# Patient Record
Sex: MALE | Race: WHITE | NOT HISPANIC OR LATINO | Employment: FULL TIME | URBAN - METROPOLITAN AREA
[De-identification: names, ages, dates, MRNs, and addresses within clinical notes are randomized per-mention and may not be internally consistent; named-entity substitution may affect disease eponyms.]

---

## 2017-12-19 ENCOUNTER — COMMUNICATION - HEALTHEAST (OUTPATIENT)
Dept: INTERNAL MEDICINE | Facility: CLINIC | Age: 37
End: 2017-12-19

## 2017-12-19 ENCOUNTER — COMMUNICATION - HEALTHEAST (OUTPATIENT)
Dept: SCHEDULING | Facility: CLINIC | Age: 37
End: 2017-12-19

## 2018-01-19 ENCOUNTER — OFFICE VISIT - HEALTHEAST (OUTPATIENT)
Dept: INTERNAL MEDICINE | Facility: CLINIC | Age: 38
End: 2018-01-19

## 2018-01-19 DIAGNOSIS — J45.20 MILD INTERMITTENT ASTHMA WITHOUT COMPLICATION: ICD-10-CM

## 2018-01-19 DIAGNOSIS — Z79.899 MEDICATION MANAGEMENT: ICD-10-CM

## 2018-04-23 ENCOUNTER — OFFICE VISIT - HEALTHEAST (OUTPATIENT)
Dept: INTERNAL MEDICINE | Facility: CLINIC | Age: 38
End: 2018-04-23

## 2018-04-23 DIAGNOSIS — R53.83 FATIGUE: ICD-10-CM

## 2018-04-23 DIAGNOSIS — E78.5 HYPERLIPIDEMIA: ICD-10-CM

## 2018-04-23 LAB
ALBUMIN SERPL-MCNC: 3.8 G/DL (ref 3.5–5)
ALP SERPL-CCNC: 91 U/L (ref 45–120)
ALT SERPL W P-5'-P-CCNC: 34 U/L (ref 0–45)
ANION GAP SERPL CALCULATED.3IONS-SCNC: 12 MMOL/L (ref 5–18)
AST SERPL W P-5'-P-CCNC: 25 U/L (ref 0–40)
BILIRUB DIRECT SERPL-MCNC: 0.2 MG/DL
BILIRUB SERPL-MCNC: 0.7 MG/DL (ref 0–1)
BUN SERPL-MCNC: 8 MG/DL (ref 8–22)
CALCIUM SERPL-MCNC: 9.3 MG/DL (ref 8.5–10.5)
CHLORIDE BLD-SCNC: 104 MMOL/L (ref 98–107)
CHOLEST SERPL-MCNC: 187 MG/DL
CO2 SERPL-SCNC: 23 MMOL/L (ref 22–31)
CREAT SERPL-MCNC: 0.7 MG/DL (ref 0.7–1.3)
ERYTHROCYTE [DISTWIDTH] IN BLOOD BY AUTOMATED COUNT: 11.6 % (ref 11–14.5)
FASTING STATUS PATIENT QL REPORTED: YES
GFR SERPL CREATININE-BSD FRML MDRD: >60 ML/MIN/1.73M2
GLUCOSE BLD-MCNC: 79 MG/DL (ref 70–125)
HCT VFR BLD AUTO: 46.2 % (ref 40–54)
HDLC SERPL-MCNC: 45 MG/DL
HGB BLD-MCNC: 15.7 G/DL (ref 14–18)
LDLC SERPL CALC-MCNC: 114 MG/DL
MCH RBC QN AUTO: 28.9 PG (ref 27–34)
MCHC RBC AUTO-ENTMCNC: 33.9 G/DL (ref 32–36)
MCV RBC AUTO: 85 FL (ref 80–100)
PLATELET # BLD AUTO: 292 THOU/UL (ref 140–440)
PMV BLD AUTO: 8.2 FL (ref 7–10)
POTASSIUM BLD-SCNC: 4.4 MMOL/L (ref 3.5–5)
PROT SERPL-MCNC: 7 G/DL (ref 6–8)
RBC # BLD AUTO: 5.43 MILL/UL (ref 4.4–6.2)
SODIUM SERPL-SCNC: 139 MMOL/L (ref 136–145)
TRIGL SERPL-MCNC: 140 MG/DL
TSH SERPL DL<=0.005 MIU/L-ACNC: 0.59 UIU/ML (ref 0.3–5)
WBC: 9 THOU/UL (ref 4–11)

## 2018-04-24 ENCOUNTER — COMMUNICATION - HEALTHEAST (OUTPATIENT)
Dept: INTERNAL MEDICINE | Facility: CLINIC | Age: 38
End: 2018-04-24

## 2018-06-15 ENCOUNTER — COMMUNICATION - HEALTHEAST (OUTPATIENT)
Dept: SCHEDULING | Facility: CLINIC | Age: 38
End: 2018-06-15

## 2019-07-11 ENCOUNTER — COMMUNICATION - HEALTHEAST (OUTPATIENT)
Dept: SCHEDULING | Facility: CLINIC | Age: 39
End: 2019-07-11

## 2019-07-11 DIAGNOSIS — J45.20 MILD INTERMITTENT ASTHMA WITHOUT COMPLICATION: ICD-10-CM

## 2019-08-05 ENCOUNTER — COMMUNICATION - HEALTHEAST (OUTPATIENT)
Dept: INTERNAL MEDICINE | Facility: CLINIC | Age: 39
End: 2019-08-05

## 2019-08-06 ENCOUNTER — COMMUNICATION - HEALTHEAST (OUTPATIENT)
Dept: INTERNAL MEDICINE | Facility: CLINIC | Age: 39
End: 2019-08-06

## 2019-08-06 DIAGNOSIS — J45.20 MILD INTERMITTENT ASTHMA WITHOUT COMPLICATION: ICD-10-CM

## 2019-10-29 ENCOUNTER — COMMUNICATION - HEALTHEAST (OUTPATIENT)
Dept: INTERNAL MEDICINE | Facility: CLINIC | Age: 39
End: 2019-10-29

## 2019-10-29 DIAGNOSIS — J45.20 MILD INTERMITTENT ASTHMA WITHOUT COMPLICATION: ICD-10-CM

## 2019-11-14 ENCOUNTER — OFFICE VISIT (OUTPATIENT)
Dept: FAMILY MEDICINE | Facility: CLINIC | Age: 39
End: 2019-11-14
Payer: COMMERCIAL

## 2019-11-14 VITALS
HEART RATE: 60 BPM | TEMPERATURE: 97.4 F | WEIGHT: 210.4 LBS | DIASTOLIC BLOOD PRESSURE: 72 MMHG | OXYGEN SATURATION: 99 % | SYSTOLIC BLOOD PRESSURE: 126 MMHG | HEIGHT: 69 IN | BODY MASS INDEX: 31.16 KG/M2

## 2019-11-14 DIAGNOSIS — Z23 NEED FOR PROPHYLACTIC VACCINATION AND INOCULATION AGAINST INFLUENZA: ICD-10-CM

## 2019-11-14 DIAGNOSIS — F43.22 ADJUSTMENT DISORDER WITH ANXIOUS MOOD: Primary | ICD-10-CM

## 2019-11-14 DIAGNOSIS — F19.11 SUBSTANCE ABUSE IN REMISSION (H): ICD-10-CM

## 2019-11-14 DIAGNOSIS — F17.210 CIGARETTE SMOKER MOTIVATED TO QUIT: ICD-10-CM

## 2019-11-14 DIAGNOSIS — J45.20 MILD INTERMITTENT ASTHMA WITHOUT COMPLICATION: ICD-10-CM

## 2019-11-14 PROCEDURE — 90471 IMMUNIZATION ADMIN: CPT | Performed by: NURSE PRACTITIONER

## 2019-11-14 PROCEDURE — 99204 OFFICE O/P NEW MOD 45 MIN: CPT | Mod: 25 | Performed by: NURSE PRACTITIONER

## 2019-11-14 PROCEDURE — 90686 IIV4 VACC NO PRSV 0.5 ML IM: CPT | Performed by: NURSE PRACTITIONER

## 2019-11-14 SDOH — HEALTH STABILITY: MENTAL HEALTH: HOW OFTEN DO YOU HAVE A DRINK CONTAINING ALCOHOL?: NOT ASKED

## 2019-11-14 ASSESSMENT — MIFFLIN-ST. JEOR: SCORE: 1864.75

## 2019-11-14 NOTE — PATIENT INSTRUCTIONS
Patient Education     Anxiety Reaction  Anxiety is the feeling we all get when we think something bad might happen. It is a normal response to stress and usually causes only a mild reaction. When anxiety becomes more severe, it can interfere with daily life. In some cases, you may not even be aware of what it is you re anxious about. There may also be a genetic link or it may be a learned behavior in the home.  Both psychological and physical triggers cause stress reaction. It's often a response to fear or emotional stress, real or imagined. This stress may come from home, family, work, or social relationships.  During an anxiety reaction, you may feel:    Helpless    Nervous    Depressed    Irritable  Your body may show signs of anxiety in many ways. You may experience:    Dry mouth    Shakiness    Dizziness    Weakness    Trouble breathing    Breathing fast (hyperventilating)    Chest pressure    Sweating    Headache    Nausea    Diarrhea    Tiredness    Inability to sleep    Sexual problems  Home care    Try to locate the sources of stress in your life. They may not be obvious. These may include:  ? Daily hassles of life (such as traffic jams, missed appointments, or car troubles)  ? Major life changes, both good (new baby or job promotion) and bad (loss of job or loss of loved one)  ? Overload: feeling that you have too many responsibilities and can't take care of all of them at once  ? Feeling helpless or feeling that your problems are beyond what you re able to solve    Notice how your body reacts to stress. Learn to listen to your body signals. This will help you take action before the stress becomes severe.    When you can, do something about the source of your stress. (Avoid hassles, limit the amount of change that happens in your life at one time and take a break when you feel overloaded).    Unfortunately, many stressful situations can't be avoided. It is necessary to learn how to better manage stress.  There are many proven methods that will reduce your anxiety. These include simple things like exercise, good nutrition, and adequate rest. Also, there are certain techniques that are helpful:  ? Relaxation  ? Breathing exercises  ? Visualization  ? Biofeedback  ? Meditation  For more information about this, consult your healthcare provider or go to a local bookstore and review the many books and tapes available on this subject.  Follow-up care  If you feel that your anxiety is not responding to self-help measures, contact your healthcare provider or make an appointment with a counselor. You may need short-term psychological counseling and temporary medicine to help you manage stress.  Call 911  Call 911 if any of these happen:    Trouble breathing    Confusion    Drowsiness or trouble wakening    Fainting or loss of consciousness    Rapid heart rate    Seizure    New chest pain that becomes more severe, lasts longer, or spreads into your shoulder, arm, neck, jaw, or back  When to seek medical advice  Call your healthcare provider right away if any of these happen:    Your symptoms get worse    Severe headache not relieved by rest and mild pain reliever  Date Last Reviewed: 10/1/2017    2794-9962 "[employer]". [address] [place] [address]. All rights reserved. This information is not intended as a substitute for professional medical care. Always follow your healthcare professional's instructions.           Patient Education     Resources to Help You Quit Smoking  If you have quit smoking or are thinking about quitting, congratulations! It can be hard to quit smoking, but the benefits are well worth it. To help you quit and stay smoke-free, there are many resources that can help.  Your health plan  If you have health insurance, call them for more details about their phone coaching programs.    [facility]: 9-902-346-BLUE    [facility]: 7-724-923-QUIT    [employer] [facility]  [place]: 7-061-056-BLUE    [facility]: [phone]    [employer]: 1-498.781.9623    [facility] [employer] members: 1-891.540.7518    [facility] Plan: [phone]    [facility] Plan: 1-431.395.9730    [facility] [place]: 4-190-617-8600  American Cancer Society: 1-826.507.2806  The American Cancer Society can help you find local resources to quit smoking.  [employer]: 3-795-995-PLAN (8308)  Offers a telephone helpline, gum, patches and lozenges. These services are free for the uninsured and those without coverage. The online program is free to everyone at www.[employer].  American Lung Association: 2-485-XLTQ-USA (643-3712)  Provides a lung helpline as well as an online program, self-help book and group clinic support for quitting smoking. www.lung.org/stop-smoking  [employer] [place]: 9-573-439-QUIT (4191)  Offers a telephone hotline, online text chat and a website with tools, information and support for smokers who want to quit. www.smokefree.gov  Medication Therapy Management:  826.669.7692 ([facility])  925.583.5094 ([place])  This is a clinic program to help you quit smoking. It offers one-on-one sessions with a pharmacist.  [employer] Group Tobacco Cessation: 234.443.1697  Small group sessions held throughout the [facility] area for people who are trying to live free from tobacco. www.[employer].org/exhale  Call to reserve your spot or for additional information.  For informational purposes only. Not to replace the advice of your health care provider.  Copyright   2013 [employer]. All rights reserved. [employer] [id number] - Rev 12/15.  For informational purposes only. Not to replace the advice of your health care provider.  Copyright   2018 [employer]. All rights reserved.

## 2019-11-14 NOTE — PROGRESS NOTES
"Subjective     Mason Velarde is a 38 year old male who presents to clinic today for the following health issues:    HPI   New Patient/Transfer of Care  Abnormal Mood Symptoms  Onset: 4 days ago     Description:   Depression: no   Anxiety: YES    Accompanying Signs & Symptoms:  Still participating in activities that you used to enjoy: YES  Fatigue: no   Irritability: no   Difficulty concentrating: YES  Changes in appetite: no   Problems with sleep: no   Heart racing/beating fast : YES  Thoughts of hurting yourself or others: none    History:   Recent stress: YES- multiple factors   Prior depression hospitalization: None  Family history of depression: YES  Family history of anxiety: YES    Precipitating factors:   Alcohol/drug use: no - past     Alleviating factors:  Nothing     Therapies Tried and outcome: None    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  Asthma Follow-Up    Was ACT completed today?    Yes    ACT Total Scores 11/14/2019   ACT TOTAL SCORE (Goal Greater than or Equal to 20) 19   In the past 12 months, how many times did you visit the emergency room for your asthma without being admitted to the hospital? 0   In the past 12 months, how many times were you hospitalized overnight because of your asthma? 0       How many days per week do you miss taking your asthma controller medication?  I do not have an asthma controller medication    Please describe any recent triggers for your asthma: Patient is unaware of triggers " "and pt is smoker, ready to quit already has supplies    Have you had any Emergency Room Visits, Urgent Care Visits, or Hospital Admissions since your last office visit?  No      Patient Active Problem List   Diagnosis     Mild intermittent asthma without complication     Adjustment disorder with anxious mood     Substance abuse in remission (H)     Cigarette smoker motivated to quit     Past Surgical History:   Procedure Laterality Date     BACK SURGERY  2005    L1 vertebre plate (piece of rib put in spine)       Social History     Tobacco Use     Smoking status: Current Every Day Smoker     Packs/day: 0.50     Years: 25.00     Pack years: 12.50     Types: Cigarettes     Smokeless tobacco: Never Used   Substance Use Topics     Alcohol use: Not Currently     Family History   Problem Relation Age of Onset     Depression Mother      Mental Illness Mother      Heart Disease Father      Coronary Artery Disease Early Onset Father      Heart Failure Father      Hyperlipidemia Father      Depression Sister      Mental Illness Sister      Heart Disease Paternal Uncle      Coronary Artery Disease Early Onset Paternal Uncle          Current Outpatient Medications   Medication Sig Dispense Refill     albuterol (PROAIR RESPICLICK) 108 (90 Base) MCG/ACT inhaler Inhale 2 puffs into the lungs every 6 hours as needed for shortness of breath / dyspnea or wheezing 1 Inhaler 1     No Known Allergies  No lab results found.   BP Readings from Last 3 Encounters:   11/14/19 126/72    Wt Readings from Last 3 Encounters:   11/14/19 95.4 kg (210 lb 6.4 oz)                    Reviewed and updated as needed this visit by Provider         Review of Systems   ROS COMP: Constitutional, HEENT, cardiovascular, pulmonary, GI, , musculoskeletal, neuro, skin, endocrine and psych systems are negative, except as otherwise noted.      Objective    /72   Pulse 60   Temp 97.4  F (36.3  C) (Oral)   Ht 1.753 m (5' 9\")   Wt 95.4 kg (210 lb 6.4 oz)  "  SpO2 99%   BMI 31.07 kg/m    Body mass index is 31.07 kg/m .  Physical Exam   GENERAL: healthy, alert and no distress  EYES: Eyes grossly normal to inspection, PERRL and conjunctivae and sclerae normal  NECK: no adenopathy, no asymmetry, masses, or scars and thyroid normal to palpation  RESP: lungs clear to auscultation - no rales, rhonchi or wheezes  CV: regular rate and rhythm, normal S1 S2, no S3 or S4, no murmur, click or rub, no peripheral edema and peripheral pulses strong  ABDOMEN: soft, nontender, no hepatosplenomegaly, no masses and bowel sounds normal  MS: no gross musculoskeletal defects noted, no edema  SKIN: no suspicious lesions or rashes  NEURO: Normal strength and tone, mentation intact and speech normal  PSYCH: MENTAL STATUS EXAM  Appearance: appropriate  Attitude: cooperative  Behavior: normal, slightly restless   Eye Contact: normal  Speech: normal  Orientation: oriented to person , place, time and situation  Mood: states his mood has been stable/ good and anxious at times  Affect: Normal/bright, anxious appearing at times  Thought Process: clear     Diagnostic Test Results:  Labs reviewed in Epic        Assessment & Plan       ICD-10-CM    1. Adjustment disorder with anxious mood F43.22    2. Mild intermittent asthma without complication J45.20 albuterol (PROAIR RESPICLICK) 108 (90 Base) MCG/ACT inhaler   3. Substance abuse in remission (H) F19.11     meth   4. Cigarette smoker motivated to quit F17.200    5. Need for prophylactic vaccination and inoculation against influenza Z23 INFLUENZA VACCINE IM > 6 MONTHS VALENT IIV4 [14396]     Vaccine Administration, Initial [83196]   6. BMI 31.0-31.9,adult Z68.31    pt was sick with viral illness and stress he felt led to anxiety symptoms, we discussed anxiety in detail and pt reports worry that he has anxiety that causes it more than anything   He seems a bit anxious or just a lot of coffee perhaps, recommended he cut down from 4-5 cups to 1-2 daily,  "instructed to monitor and pt declines counseling or meds at this time    Discussed the pathophysiology of anxiety episodes and the various symptoms seen associated with anxiety episodes.  Discussed possible triggers including fatigue, depression, stress, and chemicals such as alcohol, caffeine and certain drugs.  Discussed the treatment including an aerobic exercise program, adequate rest, and both rescue meds and maintenance meds.     Doing well with sobriety, continue  In process of quitting smoking discussed can also be stressful, congrats on good lifestyle changes  Return to Clinic for health maintenance exam with     Asthma and using rescue only, consider spirometry or adding daily controller if using a lot of the albuterol, flu shot done      Tobacco Cessation:   reports that he has been smoking cigarettes. He has a 12.50 pack-year smoking history. He has never used smokeless tobacco.  Tobacco Cessation Action Plan: Self help information given to patient      BMI:   Estimated body mass index is 31.07 kg/m  as calculated from the following:    Height as of this encounter: 1.753 m (5' 9\").    Weight as of this encounter: 95.4 kg (210 lb 6.4 oz).   Weight management plan: deferred to e  Will do fasting lab work       See Patient Instructions    Return in about 4 weeks (around 12/12/2019) for next annual exam or as needed.    MAKAYLA Magaña [facility]    "

## 2019-11-15 ASSESSMENT — ASTHMA QUESTIONNAIRES: ACT_TOTALSCORE: 19

## 2020-01-02 ENCOUNTER — TELEPHONE (OUTPATIENT)
Dept: FAMILY MEDICINE | Facility: CLINIC | Age: 40
End: 2020-01-02

## 2020-01-02 DIAGNOSIS — F41.9 ANXIETY: ICD-10-CM

## 2020-01-02 DIAGNOSIS — F19.11 SUBSTANCE ABUSE IN REMISSION (H): ICD-10-CM

## 2020-01-02 DIAGNOSIS — E55.9 VITAMIN D DEFICIENCY: ICD-10-CM

## 2020-01-02 DIAGNOSIS — F43.22 ADJUSTMENT DISORDER WITH ANXIOUS MOOD: ICD-10-CM

## 2020-01-02 DIAGNOSIS — Z00.00 HEALTHCARE MAINTENANCE: Primary | ICD-10-CM

## 2020-01-03 NOTE — TELEPHONE ENCOUNTER
Reason for call:  Order   Order or referral being requested: lab  Reason for request: labs  Date needed: as soon as possible  Has the patient been seen by the PCP for this problem? YES    Additional comments: Patient has lab appointment scheduled 01/10/2020. Can you please put fasting lab orders in. Thanks!    Phone number to reach patient:  Home number on file [phone] (home)    Best Time:  anytime    Can we leave a detailed message on this number?  Not Applicable

## 2020-01-03 NOTE — TELEPHONE ENCOUNTER
It looks like Martita advised a follow-up visit in 4 weeks from 11/14 for an annual exam. Was patient intended to have this be an annual and not just lab only?  Defer to Martita in the office early next week.  SOL Quezada

## 2020-01-08 NOTE — TELEPHONE ENCOUNTER
I will sign these and pt needs to schedule a physical in which we can go over results (within a few days or week afterwards) (1/13-20th)    Routing to reception to schedule this  Thanks  Martita BENAVIDES CNP

## 2020-01-10 DIAGNOSIS — E55.9 VITAMIN D DEFICIENCY: ICD-10-CM

## 2020-01-10 DIAGNOSIS — Z00.00 HEALTHCARE MAINTENANCE: ICD-10-CM

## 2020-01-10 LAB
ALBUMIN SERPL-MCNC: 3.9 G/DL (ref 3.4–5)
ALP SERPL-CCNC: 69 U/L (ref 40–150)
ALT SERPL W P-5'-P-CCNC: 45 U/L (ref 0–70)
ANION GAP SERPL CALCULATED.3IONS-SCNC: 7 MMOL/L (ref 3–14)
AST SERPL W P-5'-P-CCNC: 29 U/L (ref 0–45)
BILIRUB SERPL-MCNC: 0.6 MG/DL (ref 0.2–1.3)
BUN SERPL-MCNC: 15 MG/DL (ref 7–30)
CALCIUM SERPL-MCNC: 9 MG/DL (ref 8.5–10.1)
CHLORIDE SERPL-SCNC: 105 MMOL/L (ref 94–109)
CHOLEST SERPL-MCNC: 181 MG/DL
CO2 SERPL-SCNC: 26 MMOL/L (ref 20–32)
CREAT SERPL-MCNC: 0.84 MG/DL (ref 0.66–1.25)
ERYTHROCYTE [DISTWIDTH] IN BLOOD BY AUTOMATED COUNT: 13.3 % (ref 10–15)
GFR SERPL CREATININE-BSD FRML MDRD: >90 ML/MIN/{1.73_M2}
GLUCOSE SERPL-MCNC: 87 MG/DL (ref 70–99)
HCT VFR BLD AUTO: 44.5 % (ref 40–53)
HDLC SERPL-MCNC: 57 MG/DL
HGB BLD-MCNC: 15.3 G/DL (ref 13.3–17.7)
LDLC SERPL CALC-MCNC: 106 MG/DL
MCH RBC QN AUTO: 29.5 PG (ref 26.5–33)
MCHC RBC AUTO-ENTMCNC: 34.4 G/DL (ref 31.5–36.5)
MCV RBC AUTO: 86 FL (ref 78–100)
NONHDLC SERPL-MCNC: 124 MG/DL
PLATELET # BLD AUTO: 225 10E9/L (ref 150–450)
POTASSIUM SERPL-SCNC: 4.1 MMOL/L (ref 3.4–5.3)
PROT SERPL-MCNC: 7.2 G/DL (ref 6.8–8.8)
RBC # BLD AUTO: 5.19 10E12/L (ref 4.4–5.9)
SODIUM SERPL-SCNC: 138 MMOL/L (ref 133–144)
TRIGL SERPL-MCNC: 88 MG/DL
TSH SERPL DL<=0.005 MIU/L-ACNC: 1.48 MU/L (ref 0.4–4)
WBC # BLD AUTO: 7.1 10E9/L (ref 4–11)

## 2020-01-10 PROCEDURE — 80053 COMPREHEN METABOLIC PANEL: CPT | Performed by: NURSE PRACTITIONER

## 2020-01-10 PROCEDURE — 36415 COLL VENOUS BLD VENIPUNCTURE: CPT | Performed by: NURSE PRACTITIONER

## 2020-01-10 PROCEDURE — 85027 COMPLETE CBC AUTOMATED: CPT | Performed by: NURSE PRACTITIONER

## 2020-01-10 PROCEDURE — 80061 LIPID PANEL: CPT | Performed by: NURSE PRACTITIONER

## 2020-01-10 PROCEDURE — 84443 ASSAY THYROID STIM HORMONE: CPT | Performed by: NURSE PRACTITIONER

## 2020-01-10 PROCEDURE — 82306 VITAMIN D 25 HYDROXY: CPT | Performed by: NURSE PRACTITIONER

## 2020-01-10 NOTE — LETTER
January 20, 2020      Mason Velarde  [address] [facility] [address]        Dear ,    We are writing to inform you of your test results.    Your test results fall within the expected range(s) or remain unchanged from previous results.  Please continue with current treatment plan.    Resulted Orders   Vitamin D Deficiency   Result Value Ref Range    Vitamin D Deficiency screening 27 20 - 75 ug/L      Comment:      Season, race, dietary intake, and treatment affect the concentration of   25-hydroxy-Vitamin D. Values may decrease during winter months and increase   during summer months. Values 20-29 ug/L may indicate Vitamin D insufficiency   and values <20 ug/L may indicate Vitamin D deficiency.  Vitamin D determination is routinely performed by an immunoassay specific for   25 hydroxyvitamin D3.  If an individual is on vitamin D2 (ergocalciferol)   supplementation, please specify 25 OH vitamin D2 and D3 level determination by   LCMSMS test VITD23.     **CBC with platelets FUTURE anytime   Result Value Ref Range    WBC 7.1 4.0 - 11.0 10e9/L    RBC Count 5.19 4.4 - 5.9 10e12/L    Hemoglobin 15.3 13.3 - 17.7 g/dL    Hematocrit 44.5 40.0 - 53.0 %    MCV 86 78 - 100 fl    MCH 29.5 26.5 - 33.0 pg    MCHC 34.4 31.5 - 36.5 g/dL    RDW 13.3 10.0 - 15.0 %    Platelet Count 225 150 - 450 10e9/L   **TSH with free T4 reflex FUTURE 1yr   Result Value Ref Range    TSH 1.48 0.40 - 4.00 mU/L   **Comprehensive metabolic panel FUTURE 1yr   Result Value Ref Range    Sodium 138 133 - 144 mmol/L    Potassium 4.1 3.4 - 5.3 mmol/L    Chloride 105 94 - 109 mmol/L    Carbon Dioxide 26 20 - 32 mmol/L    Anion Gap 7 3 - 14 mmol/L    Glucose 87 70 - 99 mg/dL      Comment:      Fasting specimen    Urea Nitrogen 15 7 - 30 mg/dL    Creatinine 0.84 0.66 - 1.25 mg/dL    GFR Estimate >90 >60 mL/min/[1.73_m2]      Comment:      Non  GFR Calc  Starting 12/18/2018, serum creatinine based estimated GFR (eGFR) will be   calculated  using the Chronic Kidney Disease Epidemiology Collaboration   (CKD-EPI) equation.      GFR Estimate If Black >90 >60 mL/min/[1.73_m2]      Comment:       GFR Calc  Starting 12/18/2018, serum creatinine based estimated GFR (eGFR) will be   calculated using the Chronic Kidney Disease Epidemiology Collaboration   (CKD-EPI) equation.      Calcium 9.0 8.5 - 10.1 mg/dL    Bilirubin Total 0.6 0.2 - 1.3 mg/dL    Albumin 3.9 3.4 - 5.0 g/dL    Protein Total 7.2 6.8 - 8.8 g/dL    Alkaline Phosphatase 69 40 - 150 U/L    ALT 45 0 - 70 U/L    AST 29 0 - 45 U/L   Lipid panel reflex to direct LDL Fasting   Result Value Ref Range    Cholesterol 181 <200 mg/dL    Triglycerides 88 <150 mg/dL      Comment:      Fasting specimen    HDL Cholesterol 57 >39 mg/dL    LDL Cholesterol Calculated 106 (H) <100 mg/dL      Comment:      Above desirable:  100-129 mg/dl  Borderline High:  130-159 mg/dL  High:             160-189 mg/dL  Very high:       >189 mg/dl      Non HDL Cholesterol 124 <130 mg/dL       If you have any questions or concerns, please call the clinic at the number listed above.       Sincerely,        [facility]

## 2020-01-13 LAB — DEPRECATED CALCIDIOL+CALCIFEROL SERPL-MC: 27 UG/L (ref 20–75)

## 2020-01-16 NOTE — PROGRESS NOTES
"  3  SUBJECTIVE:   CC: Mason Velarde is an 39 year old male who presents for preventive health visit.     Healthy Habits:    Do you get at least three servings of calcium containing foods daily (dairy, green leafy vegetables, etc.)? yes    Amount of exercise or daily activities, outside of work: 5 day(s) per week    Problems taking medications regularly No    Medication side effects: No    Have you had an eye exam in the past two years? yes    Do you see a dentist twice per year? 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 Yes        Social History     Tobacco Use     Smoking status: Current Every Day Smoker     Packs/day: 0.50     Years: 25.00     Pack years: 12.50     Types: Cigarettes     Smokeless tobacco: Never Used   Substance Use Topics     Alcohol use: Not Currently     If you drink alcohol do you typically have >3 drinks per day or >7 drinks per week? No                      Last PSA: No results found for: PSA    Reviewed orders with patient. Reviewed health maintenance and updated orders accordingly - Yes  Lab work is in process  Labs reviewed in EPIC  BP Readings from Last 3 Encounters:   01/17/20 130/84   11/14/19 126/72    Wt " Readings from Last 3 Encounters:   01/17/20 95.1 kg (209 lb 11.2 oz)   11/14/19 95.4 kg (210 lb 6.4 oz)                  Patient Active Problem List   Diagnosis     Mild intermittent asthma without complication     Adjustment disorder with anxious mood     Substance abuse in remission (H)     Cigarette smoker motivated to quit     BMI 31.0-31.9,adult     Past Surgical History:   Procedure Laterality Date     BACK SURGERY  2005    L1 vertebre plate (piece of rib put in spine)       Social History     Tobacco Use     Smoking status: Current Every Day Smoker     Packs/day: 0.50     Years: 25.00     Pack years: 12.50     Types: Cigarettes     Smokeless tobacco: Never Used   Substance Use Topics     Alcohol use: Not Currently     Family History   Problem Relation Age of Onset     Depression Mother      Mental Illness Mother      Heart Disease Father      Coronary Artery Disease Early Onset Father      Heart Failure Father      Hyperlipidemia Father      Depression Sister      Mental Illness Sister      Heart Disease Paternal Uncle      Coronary Artery Disease Early Onset Paternal Uncle          Current Outpatient Medications   Medication Sig Dispense Refill     albuterol (PROAIR RESPICLICK) 108 (90 Base) MCG/ACT inhaler Inhale 2 puffs into the lungs every 6 hours as needed for shortness of breath / dyspnea or wheezing 1 Inhaler 1     No Known Allergies  Recent Labs   Lab Test 01/10/20  0856   *   HDL 57   TRIG 88   ALT 45   CR 0.84   GFRESTIMATED >90   GFRESTBLACK >90   POTASSIUM 4.1   TSH 1.48        Reviewed and updated as needed this visit by clinical staff         Reviewed and updated as needed this visit by Provider        Past Medical History:   Diagnosis Date     Uncomplicated asthma       Past Surgical History:   Procedure Laterality Date     BACK SURGERY  2005    L1 vertebre plate (piece of rib put in spine)       ROS:  CONSTITUTIONAL: NEGATIVE for fever, chills, change in weight  INTEGUMENTARY/SKIN:  "NEGATIVE for worrisome rashes, moles or lesions  EYES: NEGATIVE for vision changes or irritation  ENT: NEGATIVE for ear, mouth and throat problems  RESP: NEGATIVE for significant cough or SOB  CV: NEGATIVE for chest pain, palpitations or peripheral edema  GI: NEGATIVE for nausea, abdominal pain, heartburn, or change in bowel habits   male: negative for dysuria, hematuria, decreased urinary stream, erectile dysfunction, urethral discharge  MUSCULOSKELETAL: NEGATIVE for significant arthralgias or myalgia  NEURO: NEGATIVE for weakness, dizziness or paresthesias  ENDOCRINE: NEGATIVE for temperature intolerance, skin/hair changes  HEME/ALLERGY/IMMUNE: NEGATIVE for bleeding problems  PSYCHIATRIC: NEGATIVE for changes in mood or affect    OBJECTIVE:   /84   Pulse 69   Temp 97.9  F (36.6  C) (Temporal)   Resp 12   Ht 1.767 m (5' 9.57\")   Wt 95.1 kg (209 lb 11.2 oz)   SpO2 98%   BMI 30.46 kg/m       EXAM:  GENERAL: healthy, alert and no distress  EYES: Eyes grossly normal to inspection, PERRL and conjunctivae and sclerae normal  HENT: ear canals and TM's normal, nose and mouth without ulcers or lesions  NECK: no adenopathy, no asymmetry, masses, or scars and thyroid normal to palpation  RESP: lungs clear to auscultation - no rales, rhonchi or wheezes  CV: regular rate and rhythm, normal S1 S2, no S3 or S4, no murmur, click or rub, no peripheral edema and peripheral pulses strong  ABDOMEN: soft, nontender, no hepatosplenomegaly, no masses and bowel sounds normal   (male): normal male genitalia without lesions or urethral discharge, no hernia  MS: no gross musculoskeletal defects noted, no edema  SKIN: no suspicious lesions or rashes  NEURO: Normal strength and tone, mentation intact and speech normal  PSYCH: mentation appears normal, affect normal/bright    Diagnostic Test Results:  Labs reviewed in Epic    ASSESSMENT/PLAN:       ICD-10-CM    1. Routine general medical examination at a health care facility " "Z00.00        COUNSELING:  Reviewed preventive health counseling, as reflected in patient instructions       Regular exercise       Healthy diet/nutrition       Vision screening       Immunizations    Vaccinated for: TDAP             Safe sex practices/STD prevention       Osteoporosis Prevention/Bone Health    Estimated body mass index is 31.07 kg/m  as calculated from the following:    Height as of 11/14/19: 1.753 m (5' 9\").    Weight as of 11/14/19: 95.4 kg (210 lb 6.4 oz).    Weight management plan: Discussed healthy diet and exercise guidelines     reports that he has been smoking cigarettes. He has a 12.50 pack-year smoking history. He has never used smokeless tobacco.  Tobacco Cessation Action Plan: Self help information given to patient    Counseling Resources:  ATP IV Guidelines  Pooled Cohorts Equation Calculator  FRAX Risk Assessment  ICSI Preventive Guidelines  Dietary Guidelines for Americans, 2010  USDA's MyPlate  ASA Prophylaxis  Lung CA Screening    MAKAYLA Magaña CNP  [facility] [place] [facility]  "

## 2020-01-17 ENCOUNTER — OFFICE VISIT (OUTPATIENT)
Dept: FAMILY MEDICINE | Facility: CLINIC | Age: 40
End: 2020-01-17
Payer: COMMERCIAL

## 2020-01-17 VITALS
WEIGHT: 209.7 LBS | DIASTOLIC BLOOD PRESSURE: 84 MMHG | HEART RATE: 69 BPM | RESPIRATION RATE: 12 BRPM | HEIGHT: 70 IN | OXYGEN SATURATION: 98 % | TEMPERATURE: 97.9 F | BODY MASS INDEX: 30.02 KG/M2 | SYSTOLIC BLOOD PRESSURE: 130 MMHG

## 2020-01-17 DIAGNOSIS — Z00.00 ROUTINE GENERAL MEDICAL EXAMINATION AT A HEALTH CARE FACILITY: Primary | ICD-10-CM

## 2020-01-17 PROCEDURE — 90471 IMMUNIZATION ADMIN: CPT | Performed by: NURSE PRACTITIONER

## 2020-01-17 PROCEDURE — 90715 TDAP VACCINE 7 YRS/> IM: CPT | Performed by: NURSE PRACTITIONER

## 2020-01-17 PROCEDURE — 99395 PREV VISIT EST AGE 18-39: CPT | Mod: 25 | Performed by: NURSE PRACTITIONER

## 2020-01-17 ASSESSMENT — MIFFLIN-ST. JEOR: SCORE: 1865.57

## 2020-03-03 ENCOUNTER — TELEPHONE (OUTPATIENT)
Dept: FAMILY MEDICINE | Facility: CLINIC | Age: 40
End: 2020-03-03

## 2020-03-03 NOTE — LETTER
[facility] [place] [facility]  [address] [place] [id number]  Phone: [phone]  Fax: [phone]              March 3, 2020      Mason Velarde  [address] [facility] [address]        Dear Mason,    I care about your health and have reviewed your health plan. I have reviewed your medical conditions, medication list, and lab results and am making recommendations based on this review, to better manage your health.    You are in particular need of attention regarding:  -Asthma    I am recommending that you:  -Complete and return the attached ASTHMA CONTROL TEST.  If your total score is 19 or less or you have been to the ER or urgent care for your asthma, then please schedule an asthma followup appointment.      Please call us at [phone] (or use [email or web address]) to address the above recommendations.     Thank you for trusting [facility] and we appreciate the opportunity to serve you.  We look forward to supporting your healthcare needs in the future.    Healthy Regards,            Martita Heard NP

## 2020-03-03 NOTE — TELEPHONE ENCOUNTER
Panel Management Review      Patient has the following on his problem list:     Asthma review     ACT Total Scores 11/14/2019   ACT TOTAL SCORE (Goal Greater than or Equal to 20) 19   In the past 12 months, how many times did you visit the emergency room for your asthma without being admitted to the hospital? 0   In the past 12 months, how many times were you hospitalized overnight because of your asthma? 0      1. Is Asthma diagnosis on the Problem List? Yes    2. Is Asthma listed on Health Maintenance? Yes    3. Patient is due for:  ACT      Composite cancer screening  Chart review shows that this patient is due/due soon for the following None  Summary:    Patient is due/failing the following:   ACT    Action needed:   Patient needs to do ACT.    Type of outreach:    Sent letter. and Copy of ACT mailed to patient, will reach out in 5 days.    Questions for provider review:    None                                                                                   Thanks,  Delores Andersen

## 2020-03-03 NOTE — LETTER
Mason Velarde  [address] [facility] [address]              [facility]  [address] [address]

## 2020-03-05 ENCOUNTER — COMMUNICATION - HEALTHEAST (OUTPATIENT)
Dept: INTERNAL MEDICINE | Facility: CLINIC | Age: 40
End: 2020-03-05

## 2020-03-05 DIAGNOSIS — J45.20 MILD INTERMITTENT ASTHMA WITHOUT COMPLICATION: ICD-10-CM

## 2020-06-05 ENCOUNTER — TELEPHONE (OUTPATIENT)
Dept: FAMILY MEDICINE | Facility: CLINIC | Age: 40
End: 2020-06-05

## 2020-06-05 ENCOUNTER — VIRTUAL VISIT (OUTPATIENT)
Dept: FAMILY MEDICINE | Facility: CLINIC | Age: 40
End: 2020-06-05
Payer: COMMERCIAL

## 2020-06-05 DIAGNOSIS — J45.20 MILD INTERMITTENT ASTHMA WITHOUT COMPLICATION: ICD-10-CM

## 2020-06-05 DIAGNOSIS — F19.11 SUBSTANCE ABUSE IN REMISSION (H): ICD-10-CM

## 2020-06-05 DIAGNOSIS — J45.41 MODERATE PERSISTENT ASTHMA WITH ACUTE EXACERBATION: Primary | ICD-10-CM

## 2020-06-05 PROCEDURE — 99214 OFFICE O/P EST MOD 30 MIN: CPT | Mod: 95 | Performed by: NURSE PRACTITIONER

## 2020-06-05 RX ORDER — FLUTICASONE PROPIONATE 220 UG/1
1 AEROSOL, METERED RESPIRATORY (INHALATION) 2 TIMES DAILY
Qty: 1 INHALER | Refills: 3 | Status: SHIPPED | OUTPATIENT
Start: 2020-06-05 | End: 2020-07-27

## 2020-06-05 RX ORDER — CETIRIZINE HYDROCHLORIDE 10 MG/1
10 TABLET ORAL DAILY
Qty: 90 TABLET | Refills: 1 | Status: SHIPPED | OUTPATIENT
Start: 2020-06-05

## 2020-06-05 RX ORDER — ALBUTEROL SULFATE 90 UG/1
2 POWDER, METERED RESPIRATORY (INHALATION) EVERY 6 HOURS PRN
Qty: 1 INHALER | Refills: 3 | Status: SHIPPED | OUTPATIENT
Start: 2020-06-05 | End: 2021-09-13

## 2020-06-05 ASSESSMENT — ASTHMA QUESTIONNAIRES
QUESTION_1 LAST FOUR WEEKS HOW MUCH OF THE TIME DID YOUR ASTHMA KEEP YOU FROM GETTING AS MUCH DONE AT WORK, SCHOOL OR AT HOME: SOME OF THE TIME
QUESTION_4 LAST FOUR WEEKS HOW OFTEN HAVE YOU USED YOUR RESCUE INHALER OR NEBULIZER MEDICATION (SUCH AS ALBUTEROL): TWO OR THREE TIMES PER WEEK
QUESTION_3 LAST FOUR WEEKS HOW OFTEN DID YOUR ASTHMA SYMPTOMS (WHEEZING, COUGHING, SHORTNESS OF BREATH, CHEST TIGHTNESS OR PAIN) WAKE YOU UP AT NIGHT OR EARLIER THAN USUAL IN THE MORNING: FOUR OR MORE NIGHTS A WEEK
ACT_TOTALSCORE: 12
QUESTION_2 LAST FOUR WEEKS HOW OFTEN HAVE YOU HAD SHORTNESS OF BREATH: ONCE A DAY
QUESTION_5 LAST FOUR WEEKS HOW WOULD YOU RATE YOUR ASTHMA CONTROL: SOMEWHAT CONTROLLED

## 2020-06-05 NOTE — PROGRESS NOTES
"Mason Velarde is a 39 year old male who is being evaluated via a billable telephone visit.      The patient has been notified of following:     \"This telephone visit will be conducted via a call between you and your physician/provider. We have found that certain health care needs can be provided without the need for a physical exam.  This service lets us provide the care you need with a short phone conversation.  If a prescription is necessary we can send it directly to your pharmacy.  If lab work is needed we can place an order for that and you can then stop by our lab to have the test done at a later time.    Telephone visits are billed at different rates depending on your insurance coverage. During this emergency period, for some insurers they may be billed the same as an in-person visit.  Please reach out to your insurance provider with any questions.    If during the course of the call the physician/provider feels a telephone visit is not appropriate, you will not be charged for this service.\"    Patient has given verbal consent for Telephone visit?  Yes    What phone number would you like to be contacted at? [phone]    How would you like to obtain your AVS? 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       Objective   Reported vitals:  There were no vitals " taken for this visit.   healthy, alert and no distress  PSYCH: Alert and oriented times 3; coherent speech, normal   rate and volume, able to articulate logical thoughts, able   to abstract reason, no tangential thoughts, no hallucinations   or delusions  His affect is normal  RESP: No cough, no audible wheezing, able to talk in full sentences  Remainder of exam unable to be completed due to telephone visits    Diagnostic Test Results:  Labs reviewed in Epic        Assessment/Plan:    ICD-10-CM    1. Moderate persistent asthma with acute exacerbation  J45.41 fluticasone (FLOVENT HFA) 220 MCG/ACT inhaler     cetirizine (ZYRTEC) 10 MG tablet   2. Mild intermittent asthma without complication  J45.20 albuterol (PROAIR RESPICLICK) 108 (90 Base) MCG/ACT inhaler   3. 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      Phone call duration:  20 minutes    MAKAYLA Magaña CNP

## 2020-06-05 NOTE — TELEPHONE ENCOUNTER
Prior Authorization Retail Medication Request    Medication/Dose: albuterol (PROAIR RESPICLICK) 108 (90 Base) MCG/ACT inhaler  ICD code (if different than what is on RX):    Previously Tried and Failed:    Rationale:      Insurance Name:    Insurance ID:  [id number]  Phone: [phone]    Pharmacy Information (if different than what is on RX)  [facility]  Phone:  [phone]  Fax: [phone]

## 2020-06-05 NOTE — PATIENT INSTRUCTIONS
Patient Education     Asthma Trigger Checklist  Allergens, irritants, and other things may trigger your asthma. Check the box next to each of your triggers. After each trigger is a list of ways to avoid it.     Dust mites. Dust mites live in mattresses, bedding, carpets, curtains, and indoor dust.    To kill dust mites, wash bedding in hot water (130 F) each week.    Cover mattress and pillows with special dust-mite-proof cases.    Don t use upholstered furniture like sofas or chairs in the bedroom.    Use allergy-proof filters for air conditioners and furnaces. Replace or clean them as instructed.    If you can, replace carpeting with wood or tile juan manuel, especially in the bedroom.    Animals. Animals with fur or feathers shed dander (allergens).    It s best to choose a pet that doesn t have fur or feathers, such as a fish or a reptile.    If you have pets, keep them off of your bed and out of your bedroom.    Wash your hands and clothes after handling pets.      Mold. Mold grows in damp places, such as bathrooms, basements, and closets.    Ask someone to clean damp areas in your home every week. Or try wearing a face mask while you clean.    Run an exhaust fan while bathing. Or leave a window open in the bathroom.    Repair water leaks in or around your home.    Have someone else cut grass or rake leaves, if possible.    Don t use vaporizers or humidifiers. They encourage mold growth.      Pollen. Pollen from trees, grasses, and weeds is a common allergen. (Flower pollens are generally not a problem).    Try to learn what types of pollen affect you most. Pollen levels vary depending on the plant, the season, and the time of day.    If possible, use air conditioning instead of opening the windows in your home or car.    Have someone else do yard work, if possible.      Cockroaches. Roaches are found in many homes and produce allergens.    Keep your kitchen clean and dry. A leaky faucet or drain can attract  roaches.    Remove garbage from your home daily.    Store food in tightly sealed containers. Wash dishes as soon as they are used.    Use bait stations or traps to control roaches. Avoid using chemical sprays.      Smoke. Smoke may be from cigarettes, cigars, pipes, incense or candles, barbecues or grills, and fireplaces.    Don t smoke. And don t let people smoke in your home or car.    When you travel, ask for nonsmoking rental cars and hotel rooms.    Avoid fireplaces and wood stoves. If you can t, sit away from them. Make sure the smoke is directed outside.    Don t burn incense or use candles.    Move away from smoky outdoor cooking grills.      Smog.  Smog is from car exhaust and other pollution.    Read or listen to local air-quality reports. These let you know when air quality is poor.    Stay indoors as much as you can on smoggy days. If possible, use air conditioning instead of opening the windows.    In your car, set air conditioning to recirculate air, so less pollution gets in.      Strong odors. These include air fresheners, deodorizers, and cleaning products; perfume, deodorant, and other beauty products; incense and candles; and insect sprays and other sprays.    Use scent-free products like deodorant or body lotion.    Avoid using cleaning products with bleach and ammonia. Make your own cleaning solution with white vinegar, baking soda, or mild dish soap.    Use exhaust fans while cooking. Or open a window, if possible.     Avoid perfumes, air fresheners, potpourri, and other scented products.      Other irritants. These include dust, aerosol sprays, and powders.    Wear a face mask while doing tasks like sanding, dusting, sweeping, and yard work. Open doors and windows if working indoors.    Use pump spray bottles instead of aerosols.    Pour liquid  onto a rag or cloth instead of spraying them.      Weather. Weather conditions can trigger symptoms or make them worse.    Watch for very high  or low temperatures, very humid conditions, or a lot of wind, as these conditions can make symptoms worse.    Limit outdoor activity during the type of weather that affects you.    Wear a scarf over your mouth and nose in cold weather.      Colds, flu, and sinus infections. Upper respiratory infections can trigger asthma.    Wash your hands often with soap and warm water or use a hand  containing alcohol.    Get a yearly flu shot. And ask if you should get a pneumonia vaccine.    Take care of your general health. Get plenty of sleep. And eat a variety of healthy foods.      Food additives. Food additives can trigger asthma flare-ups in some people.    Check food labels for sulfites or other similar ingredients. These are often found in foods such as wine, beer, and dried fruits.    Avoid foods that contain these additives.      Medicine. Aspirin, NSAIDS like ibuprofen and naproxen, and heart medicines like beta-blockers may be triggers.    Tell your health care provider if you think certain medicines trigger symptoms.     Be sure to read the labels on over-the-counter medicines. They may have ingredients that cause symptoms for you.     , Emotions. Laughing, crying, or feeling excited are triggers for some people.     To help you stay calm: Try breathing in slowly through your nose for a count of 2 seconds. Close your lips and breathe out for 4 seconds. Repeat.    Try to focus on a soothing image in your mind. This will help relax you and calm your breathing.    Remember to take your daily controller medicines. When you re upset or under stress, it s easy to forget.      Exercise. For some people, exercise can trigger symptoms. Don t let this keep you from being active.     If you have not been exercising regularly, start slow and work up gradually.    Take all of your medicines as prescribed.    If you use quick-relief medicine, make sure you have it with you when you exercise.    Stop if you have any  symptoms. Make sure you talk with your provider about these symptoms.  Date Last Reviewed: 1/1/2017 2000-2019 [employer]. [address] [place] [address], [place] [address]. All rights reserved. This information is not intended as a substitute for professional medical care. Always follow your healthcare professional's instructions.           Patient Education     Asthma Medicine     Get used to using your inhaled corticosteroid medicine before you brush your teeth. That way you will always rinse your mouth afterward.   Medicines play a key role in controlling asthma. Some help control asthma symptoms and prevent flare-ups. Others are used to treat symptoms when they occur. Always take your medicine as prescribed. Know the names of your medicines and how and when to use them. If you have any questions about your medicines, talk with your healthcare provider or pharmacist.  Quick-relief medicine  Quick-relief (also called  rescue ) medicines work by relaxing the muscles around the airways. This helps ease symptoms such as coughing, wheezing, and shortness of breath. Keep your quick-relief inhaler with you at all times. Quick-relief medicines:    Are inhaled when needed and only when needed. Use your quick-relief medicine when you first notice your asthma is getting worse.    Start to open the airways within a few minutes after you use them.    Can help stop a flare-up once it has begun.    May be used before exercise as directed by your healthcare provider.  Long-term control medicine  Long-term control (also called  maintenance  or controller) medicines help reduce swelling and inflammation of the airways. Some keep the muscles around your airways relaxed. This makes the airways less sensitive to triggers and less likely to flare up. Long-term control medicines:    Are taken on a schedule. For most people, this is every day. They are taken even when you feel fine.    Help keep asthma under control so you re  less likely to have symptoms.    Will not stop a flare-up once it has begun.     Inhaled corticosteroids  Inhaled corticosteroids are safe for long-term use. They are not the  steroids  that you hear about athletes abusing. They usually don't cause serious side effects. That s because they re inhaled directly into the lungs. The chance of minor side effects can be lowered even more if you:    Use a spacer with your inhaler. Ask your healthcare provider or pharmacist about using a spacer if you don't currently use one.    Rinse your mouth, gargle, and spit out the water after using your inhaled corticosteroid medicine.    Follow all instructions for cleaning inhalers and spacers.    Work with your healthcare provider to find the lowest dose that controls your asthma.      Tips for taking medicine  Remembering to take medicine each day can be hard for anyone. It can be even harder to remember when you don t have symptoms. Try these tips:    Develop a routine. For example, take long-term controllers as part of getting ready for bed, before you brush your teeth in the morning, or both.    Make sure you understand what long-term controllers do and don t do.    Refill your prescriptions on time, or even ahead of time, so you don t run out.    Carry your quick-relief medicine with you. If you can, keep a spare quick-relief inhaler at work, at school, or in your gym bag.    When you travel, make sure you have enough medicine to last for your entire trip.    When traveling by air, keep your medicines with you, not packed in your luggage.    Make sure you know how to tell if your inhaler is empty. Ask your provider or pharmacist, or check the instructions that come with your inhaler.  Working with your healthcare provider  By working with your healthcare provider, you can get the most benefit from your medicine. Talk with your healthcare provider about:    Getting the right dose. Over time, your healthcare provider may raise  or lower the dose of your controller medicine. The goal is to find the amount of medicine to keep asthma in control, without taking more than is needed.    Finding the right medicines for you. Each person is unique. It may take a few tries to find the right medicine or combination of medicines for you. If one medicine doesn t work well for you, another may work better.    Minimizing side effects. If you have side effects, don t just stop taking your medicine. Instead, call your healthcare provider. A new medicine or change in the amount of medicine may solve the problem.  Date Last Reviewed: 10/1/2016    3024-3144 [employer]. [address] [place] [address], [place] [address]. All rights reserved. This information is not intended as a substitute for professional medical care. Always follow your healthcare professional's instructions.

## 2020-06-05 NOTE — TELEPHONE ENCOUNTER
PA Initiation    Medication: albuterol (PROAIR RESPICLICK) 108 (90 Base) MCG/ACT inhaler  Insurance Company: [employer] - Phone [phone] Fax [phone]  Pharmacy Filling the Rx: [facility] #[id number] - [place], [place] - [facility] [address] AT [facility] [address]  Filling Pharmacy Phone: [phone]  Filling Pharmacy Fax: [phone]  Start Date: 6/5/2020

## 2020-06-05 NOTE — TELEPHONE ENCOUNTER
Prior Authorization Approval    Authorization Effective Date: 5/6/2020  Authorization Expiration Date: 7/5/2020  Medication: albuterol (PROAIR RESPICLICK) 108 (90 Base) MCG/ACT inhaler  Approved Dose/Quantity:   Reference #: [id number]   Insurance Company: [employer] - Phone [phone] Fax [phone]  Expected CoPay:       CoPay Card Available:      Foundation Assistance Needed:    Which Pharmacy is filling the prescription (Not needed for infusion/clinic administered): [facility] #[id number] - [address] AT [facility] [address]  Pharmacy Notified: Yes  Patient Notified: Yes, **Instructed pharmacy to notify patient when script is ready to /ship.**

## 2020-06-06 ASSESSMENT — ASTHMA QUESTIONNAIRES: ACT_TOTALSCORE: 12

## 2020-06-22 ENCOUNTER — COMMUNICATION - HEALTHEAST (OUTPATIENT)
Dept: INTERNAL MEDICINE | Facility: CLINIC | Age: 40
End: 2020-06-22

## 2020-06-22 DIAGNOSIS — J45.20 MILD INTERMITTENT ASTHMA WITHOUT COMPLICATION: ICD-10-CM

## 2020-07-03 ENCOUNTER — COMMUNICATION - HEALTHEAST (OUTPATIENT)
Dept: INTERNAL MEDICINE | Facility: CLINIC | Age: 40
End: 2020-07-03

## 2020-07-27 ENCOUNTER — VIRTUAL VISIT (OUTPATIENT)
Dept: FAMILY MEDICINE | Facility: CLINIC | Age: 40
End: 2020-07-27
Payer: COMMERCIAL

## 2020-07-27 DIAGNOSIS — F41.1 GENERALIZED ANXIETY DISORDER WITH PANIC ATTACKS: Primary | ICD-10-CM

## 2020-07-27 DIAGNOSIS — J45.40 MODERATE PERSISTENT ASTHMA WITHOUT COMPLICATION: ICD-10-CM

## 2020-07-27 DIAGNOSIS — F41.0 GENERALIZED ANXIETY DISORDER WITH PANIC ATTACKS: Primary | ICD-10-CM

## 2020-07-27 PROCEDURE — 99214 OFFICE O/P EST MOD 30 MIN: CPT | Mod: 95 | Performed by: NURSE PRACTITIONER

## 2020-07-27 RX ORDER — ALBUTEROL SULFATE 90 UG/1
2 AEROSOL, METERED RESPIRATORY (INHALATION) EVERY 4 HOURS PRN
COMMUNITY
Start: 2020-06-22 | End: 2020-07-27

## 2020-07-27 RX ORDER — HYDROXYZINE PAMOATE 25 MG/1
25 CAPSULE ORAL 4 TIMES DAILY PRN
Qty: 120 CAPSULE | Refills: 1 | Status: SHIPPED | OUTPATIENT
Start: 2020-07-27 | End: 2024-01-10

## 2020-07-27 RX ORDER — FLUTICASONE PROPIONATE 220 UG/1
1 AEROSOL, METERED RESPIRATORY (INHALATION) 2 TIMES DAILY
Qty: 1 INHALER | Refills: 3 | Status: SHIPPED | OUTPATIENT
Start: 2020-07-27 | End: 2021-01-12

## 2020-07-27 RX ORDER — ALBUTEROL SULFATE 90 UG/1
2 POWDER, METERED RESPIRATORY (INHALATION) EVERY 6 HOURS PRN
Qty: 1 INHALER | Refills: 3 | Status: SHIPPED | OUTPATIENT
Start: 2020-07-27 | End: 2023-02-14

## 2020-07-27 ASSESSMENT — ANXIETY QUESTIONNAIRES
2. NOT BEING ABLE TO STOP OR CONTROL WORRYING: SEVERAL DAYS
IF YOU CHECKED OFF ANY PROBLEMS ON THIS QUESTIONNAIRE, HOW DIFFICULT HAVE THESE PROBLEMS MADE IT FOR YOU TO DO YOUR WORK, TAKE CARE OF THINGS AT HOME, OR GET ALONG WITH OTHER PEOPLE: SOMEWHAT DIFFICULT
6. BECOMING EASILY ANNOYED OR IRRITABLE: NOT AT ALL
1. FEELING NERVOUS, ANXIOUS, OR ON EDGE: MORE THAN HALF THE DAYS
5. BEING SO RESTLESS THAT IT IS HARD TO SIT STILL: NOT AT ALL
3. WORRYING TOO MUCH ABOUT DIFFERENT THINGS: MORE THAN HALF THE DAYS
7. FEELING AFRAID AS IF SOMETHING AWFUL MIGHT HAPPEN: NOT AT ALL
GAD7 TOTAL SCORE: 7

## 2020-07-27 ASSESSMENT — PATIENT HEALTH QUESTIONNAIRE - PHQ9
5. POOR APPETITE OR OVEREATING: MORE THAN HALF THE DAYS
SUM OF ALL RESPONSES TO PHQ QUESTIONS 1-9: 0

## 2020-07-27 NOTE — PROGRESS NOTES
"Mason Velarde is a 39 year old male who is being evaluated via a billable video visit.      The patient has been notified of following:     \"This video visit will be conducted via a call between you and your physician/provider. We have found that certain health care needs can be provided without the need for an in-person physical exam.  This service lets us provide the care you need with a video conversation.  If a prescription is necessary we can send it directly to your pharmacy.  If lab work is needed we can place an order for that and you can then stop by our lab to have the test done at a later time.    Video visits are billed at different rates depending on your insurance coverage.  Please reach out to your insurance provider with any questions.    If during the course of the call the physician/provider feels a video visit is not appropriate, you will not be charged for this service.\"    Patient has given verbal consent for Video visit? Yes  How would you like to obtain your AVS? Mail a copy  If you are dropped from the video visit, the video invite should be resent to: Text to cell phone: [phone]  Will anyone else be joining your video visit? No    Subjective     Mason Velarde is a 39 year old male who presents today via video visit for the following health issues:    HPI    Anxiety Follow-Up    How are you doing with your anxiety since your last visit? Worsened. Patient would like to be prescribed Hydroxyzine.     Are you having other symptoms that might be associated with anxiety? Yes:  panic attacks, anxious, worrying more, chest gets heavy, feels like cannot breathe    Have you had a significant life event? No     Are you feeling depressed? No    Do you have any concerns with your use of alcohol or other drugs? No    Social History     Tobacco Use     Smoking status: Current Every Day Smoker     Packs/day: 0.50     Years: 25.00     Pack years: 12.50     Types: Cigarettes     Smokeless tobacco: Never Used "   Substance Use Topics     Alcohol use: Not Currently     Drug use: Not Currently     Types: Methamphetamines     Comment: in remission      No flowsheet data found.  No flowsheet data found.     Tightening in chest and anxiety   Started when on a trip with his mom  Rough  Hydroxyzine he had around, took the edge off and worked well, thinks 25mg  Needs something to calm him down in the moment   Sister takes Gabapentin, he doesn't want to take due to past history        No flowsheet data found.  No flowsheet data found.      How many servings of fruits and vegetables do you eat daily?  4 or more    On average, how many sweetened beverages do you drink each day (Examples: soda, juice, sweet tea, etc.  Do NOT count diet or artificially sweetened beverages)?   0    How many days per week do you exercise enough to make your heart beat faster? 4    How many minutes a day do you exercise enough to make your heart beat faster? 30 - 60    How many days per week do you miss taking your medication? 0    Asthma Follow-Up    Was ACT completed today?    Yes    ACT Total Scores 7/27/2020   ACT TOTAL SCORE (Goal Greater than or Equal to 20) 18   In the past 12 months, how many times did you visit the emergency room for your asthma without being admitted to the hospital? 0   In the past 12 months, how many times were you hospitalized overnight because of your asthma? 0       How many days per week do you miss taking your asthma controller medication?  0    Please describe any recent triggers for your asthma: exercise or sports and emotions    Have you had any Emergency Room Visits, Urgent Care Visits, or Hospital Admissions since your last office visit?  No       Video Start Time: 9:04 AM      Flovent helps a lot, remembering to take daily  Biking a lot and air still feels cold when he breathes but fine-no difficulties catching breath doesn't need rescue as often    Patient Active Problem List   Diagnosis     Mild intermittent  asthma without complication     Adjustment disorder with anxious mood     Substance abuse in remission (H)     Cigarette smoker motivated to quit     BMI 31.0-31.9,adult     Past Surgical History:   Procedure Laterality Date     BACK SURGERY  2005    L1 vertebre plate (piece of rib put in spine)       Social History     Tobacco Use     Smoking status: Current Every Day Smoker     Packs/day: 0.50     Years: 25.00     Pack years: 12.50     Types: Cigarettes     Smokeless tobacco: Never Used   Substance Use Topics     Alcohol use: Not Currently     Family History   Problem Relation Age of Onset     Depression Mother      Mental Illness Mother      Heart Disease Father      Coronary Artery Disease Early Onset Father      Heart Failure Father      Hyperlipidemia Father      Depression Sister      Mental Illness Sister      Heart Disease Paternal Uncle      Coronary Artery Disease Early Onset Paternal Uncle          No Known Allergies  BP Readings from Last 3 Encounters:   01/17/20 130/84   11/14/19 126/72    Wt Readings from Last 3 Encounters:   01/17/20 95.1 kg (209 lb 11.2 oz)   11/14/19 95.4 kg (210 lb 6.4 oz)                    Reviewed and updated as needed this visit by Provider         Review of Systems   Constitutional, HEENT, cardiovascular, pulmonary, GI, , musculoskeletal, neuro, skin, endocrine and psych systems are negative, except as otherwise noted.      Objective             Physical Exam     GENERAL: Healthy, alert and no distress  EYES: Eyes grossly normal to inspection.  No discharge or erythema, or obvious scleral/conjunctival abnormalities.  RESP: No audible wheeze, cough, or visible cyanosis.  No visible retractions or increased work of breathing.    SKIN: Visible skin clear. No significant rash, abnormal pigmentation or lesions.  NEURO: Cranial nerves grossly intact.  Mentation and speech appropriate for age.  PSYCH: Mentation appears normal, affect normal/bright, judgement and insight intact,  "normal speech and appearance well-groomed.      Diagnostic Test Results:  Labs reviewed in Epic        Assessment & Plan       ICD-10-CM    1. Generalized anxiety disorder with panic attacks  F41.1 hydrOXYzine (VISTARIL) 25 MG capsule    F41.0 MENTAL HEALTH REFERRAL  - Adult; Outpatient Treatment; Individual/Couples/Family/Group Therapy/Health Psychology; [facility]: [facility] [phone]; We will contact you to schedule the appointment or please call with any questions   2. Moderate persistent asthma without complication  J45.40 fluticasone (FLOVENT HFA) 220 MCG/ACT inhaler     albuterol (PROAIR RESPICLICK) 108 (90 Base) MCG/ACT inhaler   strongly encouraged counseling and pt does agree to work on this more, mom recognizes symptoms better than he does  Doing well, sobriety well, wants to stay away from gabapentin knows some who got hooked, hydroxyzine works well so will stick with this, doesn't want ssri at this time    Asthma sounds controlled, ACT a bit low, air feels cold when exercising, can do inhaler beforehand to see if helps  At some point should do in person visit to check lungs, flu shot in fall      BMI:   Estimated body mass index is 30.46 kg/m  as calculated from the following:    Height as of 1/17/20: 1.767 m (5' 9.57\").    Weight as of 1/17/20: 95.1 kg (209 lb 11.2 oz).   Weight management plan: Discussed healthy diet and exercise guidelines            No follow-ups on file.    MAKAYLA Magaña CNP  [facility] CARE      Video-Visit Details    Type of service:  Video Visit    Video End Time:9:24 AM    Originating Location (pt. Location): Home    Distant Location (provider location):  [facility] PRIMARY CARE     Platform used for Video Visit: Aisha    No follow-ups on file.       MAKAYLA Magaña CNP      "

## 2020-07-27 NOTE — PATIENT INSTRUCTIONS
Please start with counseling, therapy every week or two to try to work on the panic attacks and anxiety. Use the hydroxyzine as needed     I refilled your inhalers, call when you need a refill. Someone will call to do the questionnaires with you and we can keep track of your asthma and anxiety to see what might be causing your symptoms more--probably a combination of both.     Keep biking and staying active, work on healthy food choices and good sleep to lose a little weight that also helps you ståy healthy.     Take care!

## 2020-07-28 ASSESSMENT — ANXIETY QUESTIONNAIRES: GAD7 TOTAL SCORE: 7

## 2020-07-28 ASSESSMENT — ASTHMA QUESTIONNAIRES: ACT_TOTALSCORE: 18

## 2020-08-12 DIAGNOSIS — J45.40 MODERATE PERSISTENT ASTHMA WITHOUT COMPLICATION: ICD-10-CM

## 2020-08-17 NOTE — TELEPHONE ENCOUNTER
Routing refill request to provider for review/approval because:  ACT score does not meet RN refill protocol  Last ACT score 18 on 7/27/20    Kenna Barnett RN   [facility]

## 2020-08-18 RX ORDER — FLUTICASONE PROPIONATE 220 UG/1
1 AEROSOL, METERED RESPIRATORY (INHALATION) 2 TIMES DAILY
Qty: 1 INHALER | Refills: 3 | OUTPATIENT
Start: 2020-08-18

## 2020-11-07 ENCOUNTER — HEALTH MAINTENANCE LETTER (OUTPATIENT)
Age: 40
End: 2020-11-07

## 2021-01-08 DIAGNOSIS — J45.40 MODERATE PERSISTENT ASTHMA WITHOUT COMPLICATION: ICD-10-CM

## 2021-01-12 RX ORDER — FLUTICASONE PROPIONATE 220 UG/1
1 AEROSOL, METERED RESPIRATORY (INHALATION) 2 TIMES DAILY
Qty: 1 INHALER | Refills: 3 | Status: SHIPPED | OUTPATIENT
Start: 2021-01-12 | End: 2023-02-14

## 2021-01-12 NOTE — TELEPHONE ENCOUNTER
"Requested Prescriptions   Pending Prescriptions Disp Refills     fluticasone (FLOVENT HFA) 220 MCG/ACT inhaler 1 Inhaler 3     Sig: Inhale 1 puff into the lungs 2 times daily       Inhaled Steroids Protocol Failed - 1/8/2021  9:55 AM        Failed - Asthma control assessment score within normal limits in last 6 months     Please review ACT score.     ACT Total Scores 11/14/2019 6/5/2020 7/27/2020   ACT TOTAL SCORE (Goal Greater than or Equal to 20) 19 12 18   In the past 12 months, how many times did you visit the emergency room for your asthma without being admitted to the hospital? 0 0 0   In the past 12 months, how many times were you hospitalized overnight because of your asthma? 0 0 0               Passed - Patient is age 12 or older        Passed - Medication is active on med list        Passed - Recent (6 mo) or future (30 days) visit within the authorizing provider's specialty     Patient had office visit in the last 6 months or has a visit in the next 30 days with authorizing provider or within the authorizing provider's specialty.  See \"Patient Info\" tab in inbasket, or \"Choose Columns\" in Meds & Orders section of the refill encounter.               Routing refill request to provider for review/approval because:  ACT<20        "

## 2021-01-18 DIAGNOSIS — J45.20 MILD INTERMITTENT ASTHMA WITHOUT COMPLICATION: ICD-10-CM

## 2021-01-18 RX ORDER — ALBUTEROL SULFATE 90 UG/1
2 POWDER, METERED RESPIRATORY (INHALATION) EVERY 6 HOURS PRN
Status: CANCELLED | OUTPATIENT
Start: 2021-01-18

## 2021-01-18 NOTE — TELEPHONE ENCOUNTER
Due for health maintenance exam, please schedule this and I can refill to bridge if he is completely out in order to hold him over     I'll leave it up to him if he wants to come fasting for lab work or not, could wait a year if he wishes  Should do flu shot if hasn't had yet this year    Thanks  Martita

## 2021-01-18 NOTE — TELEPHONE ENCOUNTER
Reason for Call:  Medication or medication refill:albuterol (PROAIR RESPICLICK) 108 (90 Base) MCG/ACT inhaler    Do you use a [facility]?  Name of the pharmacy and phone number for the current request:     [facility]      Name of the medication requested:     Other request:     Can we leave a detailed message on this number? YES    Phone number patient can be reached at: Home number on file [phone] (home)    Best Time: ANY    Call taken on 1/18/2021 at 10:57 AM by Zenia Lopez

## 2021-01-24 DIAGNOSIS — J45.40 MODERATE PERSISTENT ASTHMA WITHOUT COMPLICATION: ICD-10-CM

## 2021-01-25 RX ORDER — FLUTICASONE PROPIONATE 220 UG/1
1 AEROSOL, METERED RESPIRATORY (INHALATION) 2 TIMES DAILY
OUTPATIENT
Start: 2021-01-25

## 2021-03-27 ENCOUNTER — HEALTH MAINTENANCE LETTER (OUTPATIENT)
Age: 41
End: 2021-03-27

## 2021-05-27 ENCOUNTER — RECORDS - HEALTHEAST (OUTPATIENT)
Dept: ADMINISTRATIVE | Facility: CLINIC | Age: 41
End: 2021-05-27

## 2021-05-30 NOTE — TELEPHONE ENCOUNTER
RN cannot approve Refill Request    RN can NOT refill this medication Protocol failed and NO refill given.        Priscilla Li, [facility] Triage/Med Refill 7/11/2019    Requested Prescriptions   Pending Prescriptions Disp Refills     VENTOLIN HFA 90 mcg/actuation inhaler [Pharmacy Med Name: VENTOLIN HFA INH W/DOS CTR 200PUFFS] 18 g 0     Sig: INHALE 2 PUFFS EVERY 6 HOURS AS NEEDE FOR WHEEZING       Albuterol/Levalbuterol Refill Protocol Failed - 7/11/2019  4:59 PM        Failed - PCP or prescribing provider visit in last year     Last office visit with prescriber/PCP: 4/23/2018 Pia Dickens MD OR same dept: Visit date not found OR same specialty: Visit date not found Last physical: Visit date not found       Next appt within 3 mo: Visit date not found  Next physical within 3 mo: Visit date not found  Prescriber OR PCP: Pia Dickens MD  Last diagnosis associated with med order: There are no diagnoses linked to this encounter.  If protocol passes may refill for 6 months if within 3 months of last provider visit (or a total of 9 months). If patient requesting >1 inhaler per month refill x 6 months and have patient make appointment with provider.

## 2021-05-30 NOTE — TELEPHONE ENCOUNTER
Refill Request  Did you contact pharmacy: Yes  Medication name:   Requested Prescriptions     Pending Prescriptions Disp Refills     VENTOLIN HFA 90 mcg/actuation inhaler [Pharmacy Med Name: VENTOLIN HFA INH W/DOS CTR 200PUFFS] 18 g 0     Sig: INHALE 2 PUFFS EVERY 6 HOURS AS NEEDE FOR WHEEZING     Who prescribed the medication:   Pia Dickens MD  Pharmacy Name and Location:   [id number]  Is patient out of medication: Yes  Patient notified refills processed in 72 hours:  no  Okay to leave a detailed message: yes    Patient is requesting the status of above medication.

## 2021-05-31 ENCOUNTER — RECORDS - HEALTHEAST (OUTPATIENT)
Dept: ADMINISTRATIVE | Facility: CLINIC | Age: 41
End: 2021-05-31

## 2021-05-31 VITALS — WEIGHT: 233 LBS

## 2021-05-31 NOTE — TELEPHONE ENCOUNTER
"Ok to set this up so patient has refills \"on file\" or do you like to see patient's if they need to use more often? Thank you.    Holly Sanon, [facility]    "

## 2021-05-31 NOTE — TELEPHONE ENCOUNTER
Medication Question or Clarification  Who is calling: Patient  What medication are you calling about? (include dose and sig)    Disp Refills Start End    VENTOLIN HFA 90 mcg/actuation inhaler 18 g 0 7/12/2019     Sig: INHALE 2 PUFFS EVERY 6 HOURS AS NEEDE FOR WHEEZING    Sent to pharmacy as: VENTOLIN HFA 90 mcg/actuation inhaler    E-Prescribing Status: Receipt confirmed by pharmacy (7/12/2019  7:19 AM CDT)      Who prescribed the medication?: Rupali Stoll MD  What is your question/concern?: Patient is requesting additional refills be sent to his pharmacy so that they may have them on file.  Patient stated he contacted his pharmacy and was told there were not refills available of this medication.  Pharmacy: [facility])  Okay to leave a detailed message?: No  Site CMT - Please call the pharmacy to obtain any additional needed information.

## 2021-06-01 VITALS — WEIGHT: 231 LBS

## 2021-06-02 NOTE — TELEPHONE ENCOUNTER
RN cannot approve Refill Request    RN can NOT refill this medication Protocol failed and NO refill given.     Priscilla Li, [facility] Triage/Med Refill 10/29/2019    Requested Prescriptions   Pending Prescriptions Disp Refills     VENTOLIN HFA 90 mcg/actuation inhaler 18 g 3     Sig: INHALE 2 PUFFS EVERY 6 HOURS AS NEEDE FOR WHEEZING       Albuterol/Levalbuterol Refill Protocol Failed - 10/29/2019  7:40 AM        Failed - PCP or prescribing provider visit in last year     Last office visit with prescriber/PCP: Visit date not found OR same dept: Visit date not found OR same specialty: 4/23/2018 Pia Dickens MD Last physical: Visit date not found       Next appt within 3 mo: Visit date not found  Next physical within 3 mo: Visit date not found  Prescriber OR PCP: Rupali Stoll MD  Last diagnosis associated with med order: 1. Mild intermittent asthma without complication  - VENTOLIN HFA 90 mcg/actuation inhaler; INHALE 2 PUFFS EVERY 6 HOURS AS NEEDE FOR WHEEZING  Dispense: 18 g; Refill: 3    If protocol passes may refill for 6 months if within 3 months of last provider visit (or a total of 9 months). If patient requesting >1 inhaler per month refill x 6 months and have patient make appointment with provider.

## 2021-06-02 NOTE — TELEPHONE ENCOUNTER
Refill Request  Did you contact pharmacy: Request received from patient's pharmacy via fax.   Medication name:   Requested Prescriptions     Pending Prescriptions Disp Refills     VENTOLIN HFA 90 mcg/actuation inhaler 18 g 3     Sig: INHALE 2 PUFFS EVERY 6 HOURS AS NEEDE FOR WHEEZING     Who prescribed the medication: Rupali Stoll MD   Pharmacy Name and Location: [facility])  Is patient out of medication: Information not provided.   Patient notified refills processed in 72 hours:  no  Okay to leave a detailed message: no

## 2021-06-06 NOTE — TELEPHONE ENCOUNTER
RN cannot approve Refill Request    RN can NOT refill this medication Protocol failed and NO refill given.      Priscilla Li, [facility] Triage/Med Refill 3/6/2020    Requested Prescriptions   Pending Prescriptions Disp Refills     VENTOLIN HFA 90 mcg/actuation inhaler [Pharmacy Med Name: VENTOLIN HFA INH W/DOS CTR 200PUFFS] 18 g 3     Sig: INHALE 2 PUFFS BY MOUTH EVERY 6 HOURS AS NEEDED FOR WHEEZING       Albuterol/Levalbuterol Refill Protocol Failed - 3/5/2020  3:56 PM        Failed - PCP or prescribing provider visit in last year     Last office visit with prescriber/PCP: Visit date not found OR same dept: Visit date not found OR same specialty: 4/23/2018 Pia Dickens MD Last physical: Visit date not found       Next appt within 3 mo: Visit date not found  Next physical within 3 mo: Visit date not found  Prescriber OR PCP: Rupali Stoll MD  Last diagnosis associated with med order: 1. Mild intermittent asthma without complication  - VENTOLIN HFA 90 mcg/actuation inhaler [Pharmacy Med Name: VENTOLIN HFA INH W/DOS CTR 200PUFFS]; INHALE 2 PUFFS BY MOUTH EVERY 6 HOURS AS NEEDED FOR WHEEZING  Dispense: 18 g; Refill: 3    If protocol passes may refill for 6 months if within 3 months of last provider visit (or a total of 9 months). If patient requesting >1 inhaler per month refill x 6 months and have patient make appointment with provider.

## 2021-06-09 NOTE — TELEPHONE ENCOUNTER
Called [facility] pharmacy and confirmed Ventolin does not need a PA, was processed through successfully last week.   No further action needed.

## 2021-06-09 NOTE — TELEPHONE ENCOUNTER
Prior Authorization Request  Who s requesting:  Pharmacy  [place]  Medication Name: VENTOLIN HFA 90 mcg/actuation inhaler   Insurance Plan:   Insurance Member ID Number:  [id number]  CoverMyMeds Key: N/A  Informed patient that prior authorizations can take up to 10 business days for response:   No  Okay to leave a detailed message: Yes

## 2021-06-09 NOTE — TELEPHONE ENCOUNTER
Central PA team  [phone]  Pool: [facility] PA MED ([id number])          PA has been initiated.       PA form completed and faxed insurance via Cover My Meds     Key:  [id number]      Medication:  VENTOLIN HFA    Insurance:  [employer]         Response will be received via fax and may take up to 5-10 business days depending on plan

## 2021-06-15 NOTE — PROGRESS NOTES
[facility] Follow Up Note    Mason Velarde   37 y.o. male    Date of Visit: 1/19/2018    Chief Complaint   Patient presents with     Follow-up     medication management especially his inhaler.     Subjective  Mason comes in today to reestablish care with me and to get a refill on an inhaler.  I have not seen him since 2009.  He had problems with alcoholism and methamphetamine use and has been sober for over 200 days now.  He is currently living at the [facility] and working there and will become moving on to a sober house and going to school.  He needs an inhaler as he does get intermittent wheezing and asthma problems.  He is otherwise feeling well.    ROS A comprehensive review of systems was performed and was otherwise negative    Social History:   Social History     Social History Narrative    He is sober since 2017.  He is to abuse both alcohol and methamphetamine.  Currently living at the [facility].  He is planning on going to school, has worked construction in the past.  He is single and has 1 son (2003)       Medications were reconciled.  Allergies, social and family history, and the problem list were all reviewed and updated.    Old records reviewed: Previous office visit records    Exam  General Appearance: Pleasant and alert  Vitals:    01/19/18 1204   BP: 128/80   Patient Site: Left Arm   Patient Position: Sitting   Cuff Size: Adult Regular   Pulse: 69   SpO2: 97%   Weight: (!) 233 lb (105.7 kg)      There is no height or weight on file to calculate BMI.  Wt Readings from Last 3 Encounters:   01/19/18 (!) 233 lb (105.7 kg)     HEENT: Sclera are clear.   Lungs: Normal respirations  Abdomen: Soft and nondistended  Extremities: No edema  Skin: No rashes  Neuro: Moves all extremities and has facial symmetry  Gait: Ambulates with a normal gait    Assessment/Plan  1. Mild intermittent asthma without complication  There is a refill.    2.  Previous drug abuse  He was  congratulated.          Pia Dickens MD  1/19/2018    Much or all of the text in this note was generated through the use of Dragon Dictate voice-to-text software. Errors in spelling or words which seem out of context are unintentional. Sound alike errors, in particular, may have escaped editing.

## 2021-06-17 NOTE — PROGRESS NOTES
"[facility] Follow Up Note    Mason Velarde   37 y.o. male    Date of Visit: 2018    Chief Complaint   Patient presents with     Consult     check for cholesterol      Subjective  Mason comes in today as he was giving blood and they told him that his blood was \"thick\" and a clogged up the machine.  He has had no problems or symptoms of anything else wrong.  He is going to be moving next month into a sober house.  He is planning on going to school this fall.    ROS A comprehensive review of systems was performed and was otherwise negative.    Social History     Social History Narrative    He is sober since .  He used to abuse both alcohol and methamphetamine.  Currently living at the [facility], moving into a sober house 2018.  He is planning on going to school, has worked construction in the past.  He is single and has 1 son ()       Medications were reconciled.  Allergies, social and family history, and the problem list were all reviewed and updated.      Exam  General Appearance: Pleasant and alert   Vitals:    18 1123   BP: 120/70   Patient Site: Left Arm   Patient Position: Sitting   Cuff Size: Adult Large   Pulse: 72   Weight: (!) 231 lb (104.8 kg)      There is no height or weight on file to calculate BMI.  Wt Readings from Last 3 Encounters:   18 (!) 231 lb (104.8 kg)   18 (!) 233 lb (105.7 kg)     HEENT: Sclera are clear.   Lungs: Normal respirations  Cardiac: Regular rate and rhythm   Abdomen: Soft and nondistended  Extremities: No edema  Skin: No rashes  Neuro: Moves all extremities and has facial symmetry  Gait: Ambulates with a normal gait    Assessment/Plan  1. Fatigue  We will check labs.  He is otherwise feeling fairly well.  - Basic Metabolic Panel  - HM2(CBC w/o Differential)  - Thyroid Stimulating Hormone (TSH)    2. Hyperlipidemia  He is concerned that his dad  of a heart attack at age 42.  He has had no signs or symptoms of heart disease.  - " Hepatic Profile  - Lipid Cascade          Pia Dickens MD  Internal and Geriatric Medicine  [facility]    Much or all of the text in this note was generated through the use of Dragon Dictate voice-to-text software. Errors in spelling or words which seem out of context are unintentional. Sound alike errors, in particular, may have escaped editing.

## 2021-07-03 NOTE — ADDENDUM NOTE
Addendum Note by Antonio Barth MD at 6/15/2018  9:10 PM     Author: Antonio Barth MD Service: -- Author Type: Physician    Filed: 6/15/2018  9:10 PM Encounter Date: 6/15/2018 Status: Signed    : Antonio Barth MD (Physician)    Addended by: ANTONIO BARTH on: 6/15/2018 09:10 PM        Modules accepted: Orders

## 2021-09-05 ENCOUNTER — HEALTH MAINTENANCE LETTER (OUTPATIENT)
Age: 41
End: 2021-09-05

## 2021-09-13 DIAGNOSIS — J45.40 MODERATE PERSISTENT ASTHMA WITHOUT COMPLICATION: ICD-10-CM

## 2021-09-13 RX ORDER — FLUTICASONE PROPIONATE 220 UG/1
1 AEROSOL, METERED RESPIRATORY (INHALATION) 2 TIMES DAILY
Qty: 12 G | Refills: 3 | Status: SHIPPED | OUTPATIENT
Start: 2021-09-13 | End: 2023-02-14

## 2021-09-13 NOTE — TELEPHONE ENCOUNTER
Should be fine, but I do not know which pharmacy to send it to.  Please check with patient    Pravin Durand MD

## 2021-09-13 NOTE — TELEPHONE ENCOUNTER
Martita and POD,    Patient is requesting HFA canister type inhaler and not respiclick. Med cued if agree.     Thanks,  EV Fried  [facility]

## 2021-09-14 DIAGNOSIS — J45.40 MODERATE PERSISTENT ASTHMA WITHOUT COMPLICATION: Primary | ICD-10-CM

## 2021-09-14 RX ORDER — ALBUTEROL SULFATE 90 UG/1
2 AEROSOL, METERED RESPIRATORY (INHALATION) EVERY 4 HOURS PRN
Qty: 6.7 G | Refills: 1 | Status: SHIPPED | OUTPATIENT
Start: 2021-09-14 | End: 2022-10-17

## 2021-09-14 RX ORDER — ALBUTEROL SULFATE 90 UG/1
2 AEROSOL, METERED RESPIRATORY (INHALATION) ONCE
Status: CANCELLED | OUTPATIENT
Start: 2021-09-14 | End: 2021-09-14

## 2021-09-14 NOTE — TELEPHONE ENCOUNTER
Dr. Durand,    Wrong inhaler was cued and sent to pharmacy.     Patient needs the albuterol HFA not the flovent HFA.     My apologies.   Correct med cued for patient if agree.     Thanks,  EV Fried  [facility]

## 2022-04-17 ENCOUNTER — HEALTH MAINTENANCE LETTER (OUTPATIENT)
Age: 42
End: 2022-04-17

## 2022-08-30 ENCOUNTER — TELEPHONE (OUTPATIENT)
Dept: FAMILY MEDICINE | Facility: CLINIC | Age: 42
End: 2022-08-30

## 2022-08-30 NOTE — TELEPHONE ENCOUNTER
Has not been seen in 2 years, TC please call pt to schedule appt or get ok to use PA slot with Martita- if not she will need to ok sending meds.  Alda CASEY RN

## 2022-08-30 NOTE — TELEPHONE ENCOUNTER
Reason for Call:  Other prescription    Detailed comments: Pt is requesting a refill on prescriptions before Martita Heard leaves the clinic.     Phone Number Patient can be reached at: Cell number on file:    Telephone Information:   Mobile [phone]       Best Time: Any    Can we leave a detailed message on this number? YES    Call taken on 8/30/2022 at 12:22 PM by Alda Kothari

## 2022-10-17 ENCOUNTER — TELEPHONE (OUTPATIENT)
Dept: FAMILY MEDICINE | Facility: CLINIC | Age: 42
End: 2022-10-17

## 2022-10-17 DIAGNOSIS — J45.40 MODERATE PERSISTENT ASTHMA WITHOUT COMPLICATION: ICD-10-CM

## 2022-10-17 RX ORDER — ALBUTEROL SULFATE 90 UG/1
2 AEROSOL, METERED RESPIRATORY (INHALATION) EVERY 4 HOURS PRN
Qty: 8.5 G | Refills: 0 | Status: SHIPPED | OUTPATIENT
Start: 2022-10-17 | End: 2024-01-04

## 2022-10-17 NOTE — TELEPHONE ENCOUNTER
Pt forgot his albuterol and is out of town, he doesn't use it al that often since he quit smoking but is nervous about not having it available if he needs it while he is out of town, he also understands he needs to set up an appointment with a new provider to establish care    Jesika Angeles RN   [facility]

## 2022-10-23 ENCOUNTER — HEALTH MAINTENANCE LETTER (OUTPATIENT)
Age: 42
End: 2022-10-23

## 2023-02-14 ENCOUNTER — OFFICE VISIT (OUTPATIENT)
Dept: FAMILY MEDICINE | Facility: CLINIC | Age: 43
End: 2023-02-14
Payer: COMMERCIAL

## 2023-02-14 VITALS
BODY MASS INDEX: 32.32 KG/M2 | DIASTOLIC BLOOD PRESSURE: 74 MMHG | HEART RATE: 72 BPM | WEIGHT: 218.2 LBS | RESPIRATION RATE: 20 BRPM | TEMPERATURE: 98.2 F | OXYGEN SATURATION: 99 % | SYSTOLIC BLOOD PRESSURE: 115 MMHG | HEIGHT: 69 IN

## 2023-02-14 DIAGNOSIS — Z13.220 LIPID SCREENING: ICD-10-CM

## 2023-02-14 DIAGNOSIS — Z11.4 SCREENING FOR HIV (HUMAN IMMUNODEFICIENCY VIRUS): ICD-10-CM

## 2023-02-14 DIAGNOSIS — Z13.1 ENCOUNTER FOR SCREENING EXAMINATION FOR IMPAIRED GLUCOSE REGULATION AND DIABETES MELLITUS: ICD-10-CM

## 2023-02-14 DIAGNOSIS — Z11.59 NEED FOR HEPATITIS C SCREENING TEST: ICD-10-CM

## 2023-02-14 DIAGNOSIS — J30.81 ALLERGIC TO CATS: ICD-10-CM

## 2023-02-14 DIAGNOSIS — J45.20 MILD INTERMITTENT ASTHMA WITHOUT COMPLICATION: Primary | ICD-10-CM

## 2023-02-14 LAB
CHOLEST SERPL-MCNC: 232 MG/DL
FASTING STATUS PATIENT QL REPORTED: NO
FASTING STATUS PATIENT QL REPORTED: NO
GLUCOSE BLD-MCNC: 100 MG/DL (ref 70–99)
HDLC SERPL-MCNC: 64 MG/DL
LDLC SERPL CALC-MCNC: 143 MG/DL
NONHDLC SERPL-MCNC: 168 MG/DL
TRIGL SERPL-MCNC: 124 MG/DL

## 2023-02-14 PROCEDURE — 36415 COLL VENOUS BLD VENIPUNCTURE: CPT | Performed by: PREVENTIVE MEDICINE

## 2023-02-14 PROCEDURE — 99213 OFFICE O/P EST LOW 20 MIN: CPT | Performed by: PREVENTIVE MEDICINE

## 2023-02-14 PROCEDURE — 86803 HEPATITIS C AB TEST: CPT | Performed by: PREVENTIVE MEDICINE

## 2023-02-14 PROCEDURE — 80061 LIPID PANEL: CPT | Performed by: PREVENTIVE MEDICINE

## 2023-02-14 PROCEDURE — 82947 ASSAY GLUCOSE BLOOD QUANT: CPT | Performed by: PREVENTIVE MEDICINE

## 2023-02-14 PROCEDURE — 87389 HIV-1 AG W/HIV-1&-2 AB AG IA: CPT | Performed by: PREVENTIVE MEDICINE

## 2023-02-14 ASSESSMENT — ASTHMA QUESTIONNAIRES
QUESTION_1 LAST FOUR WEEKS HOW MUCH OF THE TIME DID YOUR ASTHMA KEEP YOU FROM GETTING AS MUCH DONE AT WORK, SCHOOL OR AT HOME: NONE OF THE TIME
QUESTION_3 LAST FOUR WEEKS HOW OFTEN DID YOUR ASTHMA SYMPTOMS (WHEEZING, COUGHING, SHORTNESS OF BREATH, CHEST TIGHTNESS OR PAIN) WAKE YOU UP AT NIGHT OR EARLIER THAN USUAL IN THE MORNING: NOT AT ALL
QUESTION_2 LAST FOUR WEEKS HOW OFTEN HAVE YOU HAD SHORTNESS OF BREATH: NOT AT ALL
QUESTION_4 LAST FOUR WEEKS HOW OFTEN HAVE YOU USED YOUR RESCUE INHALER OR NEBULIZER MEDICATION (SUCH AS ALBUTEROL): TWO OR THREE TIMES PER WEEK
ACT_TOTALSCORE: 23
ACT_TOTALSCORE: 23
QUESTION_5 LAST FOUR WEEKS HOW WOULD YOU RATE YOUR ASTHMA CONTROL: COMPLETELY CONTROLLED

## 2023-02-14 ASSESSMENT — ANXIETY QUESTIONNAIRES
GAD7 TOTAL SCORE: 0
7. FEELING AFRAID AS IF SOMETHING AWFUL MIGHT HAPPEN: NOT AT ALL
2. NOT BEING ABLE TO STOP OR CONTROL WORRYING: NOT AT ALL
5. BEING SO RESTLESS THAT IT IS HARD TO SIT STILL: NOT AT ALL
6. BECOMING EASILY ANNOYED OR IRRITABLE: NOT AT ALL
3. WORRYING TOO MUCH ABOUT DIFFERENT THINGS: NOT AT ALL
1. FEELING NERVOUS, ANXIOUS, OR ON EDGE: NOT AT ALL
GAD7 TOTAL SCORE: 0
IF YOU CHECKED OFF ANY PROBLEMS ON THIS QUESTIONNAIRE, HOW DIFFICULT HAVE THESE PROBLEMS MADE IT FOR YOU TO DO YOUR WORK, TAKE CARE OF THINGS AT HOME, OR GET ALONG WITH OTHER PEOPLE: NOT DIFFICULT AT ALL

## 2023-02-14 ASSESSMENT — PATIENT HEALTH QUESTIONNAIRE - PHQ9: 5. POOR APPETITE OR OVEREATING: NOT AT ALL

## 2023-02-14 ASSESSMENT — PAIN SCALES - GENERAL: PAINLEVEL: NO PAIN (0)

## 2023-02-14 NOTE — RESULT ENCOUNTER NOTE
Mason,     Non fasting glucose is normal, you do not have diabetes.     Please do not hesitate to call us at [phone] if you have any questions or concerns.    Thank you,    Ana Paula Machuca MD MPH

## 2023-02-14 NOTE — RESULT ENCOUNTER NOTE
Mason,     LDL cholesterol is at goal for you.    Please do not hesitate to call us at [phone] if you have any questions or concerns.    Thank you,    Ana Paula Machuca MD MPH

## 2023-02-14 NOTE — PATIENT INSTRUCTIONS
At [facility], we strive to deliver an exceptional experience to you, every time we see you. If you receive a survey, please complete it as we do value your feedback.  If you have MyChart, you can expect to receive results automatically within 24 hours of their completion.  Your provider will send a note interpreting your results as well.   If you do not have MyChart, you should receive your results in about a week by mail.    Your care team:                            Family Medicine Internal Medicine   MD Rupesh Jefferson MD Shantel Branch-Fleming, MD Srinivasa Vaka, MD Katya Belousova, PAMAKAYLA Lyon CNP, MD ([facility]   Tobi Marvin, MD Iman Wilder MD Amelia Massimini APRN STARLA Aceves APRN MD Roel Calloway MD          Clinic hours: Monday - Thursday 7 am-6 pm; Fridays 7 am-5 pm.   Urgent care: Monday - Friday 10 am- 8 pm; Saturday and Sunday 9 am-5 pm.    Clinic: [phone]       [facility]: Monday - Thursday 8 am - 7 pm; Friday 8 am - 6 pm  [facility]: [phone]

## 2023-02-14 NOTE — PROGRESS NOTES
SUBJECTIVE:   CC: Mason is an 42 year old who presents for referral to Allergy and establish care   History of Present Illness       Reason for visit:  Primary care    He eats 0-1 servings of fruits and vegetables daily.He consumes 0 sweetened beverage(s) daily.He exercises with enough effort to increase his heart rate 30 to 60 minutes per day.  He exercises with enough effort to increase his heart rate 6 days per week.   He is taking medications regularly.    Here to establish primary care    Needs referral to Allergy  Girlfriend with cat, they do not live together  No bronchoconstriction  No tongue or mouth swelling  Gets itchy and watery eyes, sneezing, has been using Over the counter anti histamines  Is interested in pursuing allergy shots.       Anxiety Follow-Up    ALDAIR score is zero, No symptoms     Stopped tobacco use      How are you doing with your anxiety since your last visit? Improved     Are you having other symptoms that might be associated with anxiety? No    Have you had a significant life event? No     Are you feeling depressed? No    Do you have any concerns with your use of alcohol or other drugs? No    Social History     Tobacco Use     Smoking status: Former     Packs/day: 0.50     Years: 25.00     Pack years: 12.50     Types: Cigarettes     Smokeless tobacco: Never   Substance Use Topics     Alcohol use: Not Currently     Drug use: Not Currently     Types: Methamphetamines     Comment: in remission      ALDAIR-7 SCORE 7/27/2020 2/14/2023   Total Score 7 0     PHQ 7/27/2020   PHQ-9 Total Score 0   Q9: Thoughts of better off dead/self-harm past 2 weeks Not at all     Asthma Follow-Up    Was ACT completed today?    Yes    ACT Total Scores 2/14/2023   ACT TOTAL SCORE (Goal Greater than or Equal to 20) 23   In the past 12 months, how many times did you visit the emergency room for your asthma without being admitted to the hospital? 0   In the past 12 months, how many times were you hospitalized  overnight because of your asthma? 0          How many days per week do you miss taking your asthma controller medication?  I do not have an asthma controller medication    Please describe any recent triggers for your asthma: pollens and animal dander    Have you had any Emergency Room Visits, Urgent Care Visits, or Hospital Admissions since your last office visit?  No      Today's PHQ-2 Score:   PHQ-2 ( 1999 Pf[employer]) 2/14/2023   Q1: Little interest or pleasure in doing things 0   Q2: Feeling down, depressed or hopeless 0   PHQ-2 Score 0   PHQ-2 Total Score (12-17 Years)- Positive if 3 or more points; Administer PHQ-A if positive -   Q1: Little interest or pleasure in doing things Not at all   Q2: Feeling down, depressed or hopeless Not at all   PHQ-2 Score 0     Social History     Tobacco Use     Smoking status: Former     Packs/day: 0.50     Years: 25.00     Pack years: 12.50     Types: Cigarettes     Smokeless tobacco: Never   Substance Use Topics     Alcohol use: Not Currently       Reviewed and updated as needed this visit by clinical staff   Tobacco  Allergies  Meds  Problems  Med Hx  Surg Hx  Fam Hx          Reviewed and updated as needed this visit by Provider   Tobacco  Allergies  Meds  Problems  Med Hx  Surg Hx  Fam Hx         Past Medical History:   Diagnosis Date     Uncomplicated asthma       Past Surgical History:   Procedure Laterality Date     BACK SURGERY  2005    L1 vertebre plate (piece of rib put in spine)       Review of Systems  CONSTITUTIONAL: NEGATIVE for fever, chills, change in weight  INTEGUMENTARY/SKIN: NEGATIVE for worrisome rashes, moles or lesions  EYES: NEGATIVE for vision changes or irritation  ENT: NEGATIVE for ear, mouth and throat problems  RESP: NEGATIVE for significant cough or SOB  CV: NEGATIVE for chest pain, palpitations or peripheral edema  GI: NEGATIVE for nausea, abdominal pain, heartburn, or change in bowel habits   male: negative for dysuria, hematuria,  "decreased urinary stream, erectile dysfunction, urethral discharge  MUSCULOSKELETAL: NEGATIVE for significant arthralgias or myalgia  NEURO: NEGATIVE for weakness, dizziness or paresthesias  PSYCHIATRIC: NEGATIVE for changes in mood or affect    OBJECTIVE:   /74 (BP Location: Left arm, Patient Position: Sitting, Cuff Size: Adult Regular)   Pulse 72   Temp 98.2  F (36.8  C) (Temporal)   Resp 20   Ht 1.753 m (5' 9\")   Wt 99 kg (218 lb 3.2 oz)   SpO2 99%   BMI 32.22 kg/m      Physical Exam  GENERAL APPEARANCE: healthy, alert and no distress  EYES: Eyes grossly normal to inspection and conjunctivae and sclerae normal  RESP: lungs clear to auscultation - no rales, rhonchi or wheezes  CV: regular rates and rhythm, normal S1 S2  MS: extremities normal- no gross deformities noted   SKIN: no suspicious lesions or rashes  NEURO: Normal strength and tone, mentation intact and speech normal  PSYCH: mentation appears normal      Diagnostic Test Results:  Labs reviewed in Epic  No results found for this or any previous visit (from the past 24 hour(s)).    ASSESSMENT/PLAN:   Mason was seen today for establish care.    Diagnoses and all orders for this visit:    Mild intermittent asthma without complication  -     REVIEW OF HEALTH MAINTENANCE PROTOCOL ORDERS  -does not need a daily inhaler  -more exercise induced symptoms  -may use Albuterol before exercise     Allergic to cats  -     Adult Allergy/Asthma Referral; Future    Screening for HIV (human immunodeficiency virus)  -   Screening guidelines reviewed   -     HIV Antigen Antibody Combo    Need for hepatitis C screening test  -     Screening guidelines reviewed   -     Hepatitis C Screen Reflex to HCV RNA Quant and Genotype    Lipid screening  -     Lipid panel reflex to direct LDL Non-fasting    Encounter for screening examination for impaired glucose regulation and diabetes mellitus  -     Glucose        He reports that he has quit smoking. His smoking use " included cigarettes. He has a 12.50 pack-year smoking history. He has never used smokeless tobacco.  Nicotine/Tobacco Cessation Plan:   Has quit tobacco use             Ana Paula Machuca MD MPH  [facility] for HPI/ROS submitted by the patient on 2/14/2023  What is the reason for your visit today? : primary care  How many servings of fruits and vegetables do you eat daily?: 0-1  On average, how many sweetened beverages do you drink each day (Examples: soda, juice, sweet tea, etc.  Do NOT count diet or artificially sweetened beverages)?: 0  How many minutes a day do you exercise enough to make your heart beat faster?: 30 to 60  How many days a week do you exercise enough to make your heart beat faster?: 6  How many days per week do you miss taking your medication?: 0

## 2023-02-15 LAB
HCV AB SERPL QL IA: NONREACTIVE
HIV 1+2 AB+HIV1 P24 AG SERPL QL IA: NONREACTIVE

## 2023-02-15 NOTE — RESULT ENCOUNTER NOTE
Mason,     Screening tests for HIV and Hepatitis C are negative.     Please do not hesitate to call us at [phone] if you have any questions or concerns.    Thank you,    Ana Paula Machuca MD MPH

## 2023-06-01 ENCOUNTER — HEALTH MAINTENANCE LETTER (OUTPATIENT)
Age: 43
End: 2023-06-01

## 2023-12-07 ENCOUNTER — TELEPHONE (OUTPATIENT)
Dept: FAMILY MEDICINE | Facility: CLINIC | Age: 43
End: 2023-12-07
Payer: COMMERCIAL

## 2023-12-07 NOTE — TELEPHONE ENCOUNTER
Patient Quality Outreach    Patient is due for the following:   Asthma  -  ACT needed  Physical Preventive Adult Physical      Topic Date Due    Hepatitis B Vaccine (1 of 3 - 3-dose series) Never done    COVID-19 Vaccine (1) Never done    Flu Vaccine (1) 09/01/2023       Next Steps:   Schedule a Adult Preventative    Type of outreach:    Sent letter. and Copy of ACT mailed to patient.      Questions for provider review:    None           Shyam Cono MA

## 2023-12-07 NOTE — LETTER
December 7, 2023    Mason Velarde  [address]  [place] [address]    Dear Mason,    At [facility] we care about your health and are committed to providing quality patient care.     Here is a list of Health Maintenance topics that are due now or due soon:  Health Maintenance Due   Topic Date Due    ASTHMA ACTION PLAN  Never done    ADVANCE CARE PLANNING  Never done    HEPATITIS B IMMUNIZATION (1 of 3 - 3-dose series) Never done    COVID-19 Vaccine (1) Never done    YEARLY PREVENTIVE VISIT  01/17/2021    ASTHMA CONTROL TEST  08/14/2023    INFLUENZA VACCINE (1) 09/01/2023        We are recommending that you:  Schedule a WELLNESS (Preventative/Physical) APPOINTMENT with your primary care provider. If you go elsewhere for your wellness appointments then please disregard this reminder    ,   Complete the attached ASTHMA CONTROL TEST.  If your total score is 19 or less or you have been to the ER or urgent care for your asthma, then please schedule an asthma followup appointment with your primary care provider.    ,    Schedule a Nurse-Only appointment to update your immunizations: Your records indicate that you are not up to date with your immunizations, please schedule a nurse-only appointment to get these updated or update them at your next office visit. If this is incorrect, please disregard.    To schedule an appointment or discuss this further, you may contact us by phone at the [facility] at [phone] or online through the patient portal/[email or web address] @ [email or web address]    Thank you for trusting [facility] and we appreciate the opportunity to serve you.  We look forward to supporting your healthcare needs in the future.    Your partners in health,      Quality Committee at [facility]

## 2024-01-04 ENCOUNTER — MYC REFILL (OUTPATIENT)
Dept: FAMILY MEDICINE | Facility: CLINIC | Age: 44
End: 2024-01-04
Payer: COMMERCIAL

## 2024-01-04 DIAGNOSIS — J45.40 MODERATE PERSISTENT ASTHMA WITHOUT COMPLICATION: ICD-10-CM

## 2024-01-05 RX ORDER — ALBUTEROL SULFATE 90 UG/1
2 AEROSOL, METERED RESPIRATORY (INHALATION) EVERY 4 HOURS PRN
Qty: 8.5 G | Refills: 0 | Status: SHIPPED | OUTPATIENT
Start: 2024-01-05 | End: 2024-01-10

## 2024-01-10 ENCOUNTER — VIRTUAL VISIT (OUTPATIENT)
Dept: FAMILY MEDICINE | Facility: CLINIC | Age: 44
End: 2024-01-10
Payer: COMMERCIAL

## 2024-01-10 DIAGNOSIS — F41.1 GENERALIZED ANXIETY DISORDER WITH PANIC ATTACKS: ICD-10-CM

## 2024-01-10 DIAGNOSIS — J45.40 MODERATE PERSISTENT ASTHMA WITHOUT COMPLICATION: Primary | ICD-10-CM

## 2024-01-10 DIAGNOSIS — F19.11 SUBSTANCE ABUSE IN REMISSION (H): ICD-10-CM

## 2024-01-10 DIAGNOSIS — F41.0 GENERALIZED ANXIETY DISORDER WITH PANIC ATTACKS: ICD-10-CM

## 2024-01-10 PROCEDURE — 99214 OFFICE O/P EST MOD 30 MIN: CPT | Mod: 95 | Performed by: PREVENTIVE MEDICINE

## 2024-01-10 PROCEDURE — 96127 BRIEF EMOTIONAL/BEHAV ASSMT: CPT | Mod: 95 | Performed by: PREVENTIVE MEDICINE

## 2024-01-10 RX ORDER — ALBUTEROL SULFATE 90 UG/1
2 AEROSOL, METERED RESPIRATORY (INHALATION) EVERY 4 HOURS PRN
Qty: 8.5 G | Refills: 4 | Status: SHIPPED | OUTPATIENT
Start: 2024-01-10 | End: 2024-03-26

## 2024-01-10 RX ORDER — HYDROXYZINE PAMOATE 25 MG/1
25 CAPSULE ORAL 4 TIMES DAILY PRN
Qty: 90 CAPSULE | Refills: 4 | Status: SHIPPED | OUTPATIENT
Start: 2024-01-10

## 2024-01-10 ASSESSMENT — ANXIETY QUESTIONNAIRES
6. BECOMING EASILY ANNOYED OR IRRITABLE: NOT AT ALL
2. NOT BEING ABLE TO STOP OR CONTROL WORRYING: NOT AT ALL
GAD7 TOTAL SCORE: 0
GAD7 TOTAL SCORE: 0
3. WORRYING TOO MUCH ABOUT DIFFERENT THINGS: NOT AT ALL
7. FEELING AFRAID AS IF SOMETHING AWFUL MIGHT HAPPEN: NOT AT ALL
5. BEING SO RESTLESS THAT IT IS HARD TO SIT STILL: NOT AT ALL
IF YOU CHECKED OFF ANY PROBLEMS ON THIS QUESTIONNAIRE, HOW DIFFICULT HAVE THESE PROBLEMS MADE IT FOR YOU TO DO YOUR WORK, TAKE CARE OF THINGS AT HOME, OR GET ALONG WITH OTHER PEOPLE: NOT DIFFICULT AT ALL
7. FEELING AFRAID AS IF SOMETHING AWFUL MIGHT HAPPEN: NOT AT ALL
8. IF YOU CHECKED OFF ANY PROBLEMS, HOW DIFFICULT HAVE THESE MADE IT FOR YOU TO DO YOUR WORK, TAKE CARE OF THINGS AT HOME, OR GET ALONG WITH OTHER PEOPLE?: NOT DIFFICULT AT ALL
1. FEELING NERVOUS, ANXIOUS, OR ON EDGE: NOT AT ALL
4. TROUBLE RELAXING: NOT AT ALL

## 2024-01-10 ASSESSMENT — ASTHMA QUESTIONNAIRES
QUESTION_5 LAST FOUR WEEKS HOW WOULD YOU RATE YOUR ASTHMA CONTROL: COMPLETELY CONTROLLED
ACT_TOTALSCORE: 19
QUESTION_2 LAST FOUR WEEKS HOW OFTEN HAVE YOU HAD SHORTNESS OF BREATH: ONCE A DAY
ACT_TOTALSCORE: 19
QUESTION_1 LAST FOUR WEEKS HOW MUCH OF THE TIME DID YOUR ASTHMA KEEP YOU FROM GETTING AS MUCH DONE AT WORK, SCHOOL OR AT HOME: NONE OF THE TIME
QUESTION_4 LAST FOUR WEEKS HOW OFTEN HAVE YOU USED YOUR RESCUE INHALER OR NEBULIZER MEDICATION (SUCH AS ALBUTEROL): ONE OR TWO TIMES PER DAY
QUESTION_3 LAST FOUR WEEKS HOW OFTEN DID YOUR ASTHMA SYMPTOMS (WHEEZING, COUGHING, SHORTNESS OF BREATH, CHEST TIGHTNESS OR PAIN) WAKE YOU UP AT NIGHT OR EARLIER THAN USUAL IN THE MORNING: NOT AT ALL

## 2024-01-10 NOTE — PROGRESS NOTES
"Mason is a 43 year old who is being evaluated via a billable video visit.     Instructions Relayed to Patient by Virtual Roomer:     Patient is active on [employer]:   Relayed following to patient: \"It looks like you are active on [employer], are you able to join the visit this way? If not, do you need us to send you a link now or would you like your provider to send a link via text or email when they are ready to initiate the visit?\"    Reminded patient to ensure they were logged on to virtual visit by arrival time listed. Documented in appointment notes if patient had flexibility to initiate visit sooner than arrival time. If pediatric virtual visit, ensured pediatric patient along with parent/guardian will be present for video visit.     Patient offered the website [email or web address] and/or phone number to [employer] Help line: [phone]   How would you like to obtain your AVS? Entigo  If the video visit is dropped, the invitation should be resent by: Text to cell phone: [phone]  Will anyone else be joining your video visit? 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    Weight as of 2/14/23: 99 " kg (218 lb 3.2 oz).       Ana Paula Machuca MD MPH    [facility] NICHOLE Cuevas is a 43 year old, presenting for the following health issues:  Recheck Medication (Hydroxyzine and Albuterol )        1/10/2024     3:00 PM   Additional Questions   Roomed by Kareem OSMAN       History of Present Illness     Asthma:  He presents for follow up of asthma.  He has some cough, some wheezing, and no shortness of breath.  He is using a relief medication 2-3 times per day. He does not have a controller medication. Patient is aware of the following triggers: exercise or sports. The patient has not had a visit to the Emergency Room, Urgent Care or Hospital due to asthma since the last clinic visit.     Mental Health Follow-up:  Patient presents to follow-up on Anxiety.    Patient's anxiety since last visit has been:  Better  The patient is not having other symptoms associated with anxiety.  Any significant life events: No  Patient is not feeling anxious or having panic attacks.  Patient has no concerns about alcohol or drug use.    He eats 2-3 servings of fruits and vegetables daily.He consumes 0 sweetened beverage(s) daily.He exercises with enough effort to increase his heart rate 60 or more minutes per day.  He exercises with enough effort to increase his heart rate 6 days per week.   He is taking medications regularly.       Normally only uses Albuterol before exercise  These last 2 weeks had been sick and so was needing Albuterol more often  No night time wheezing    Would like refill on Hydroxyzine, no side effects, takes as needed for sleep if going camping or sleeping in a hotel.        Review of Systems   Constitutional, HEENT, cardiovascular, pulmonary, gi and gu systems are negative, except as otherwise noted.      Objective           Vitals:  No vitals were obtained today due to virtual visit.    Physical Exam   GENERAL: Healthy, alert and no distress  EYES: Eyes grossly normal to inspection.   No discharge or erythema, or obvious scleral/conjunctival abnormalities.  RESP: No audible wheeze, cough, or visible cyanosis.  No visible retractions or increased work of breathing.    SKIN: Visible skin clear. No significant rash, abnormal pigmentation or lesions.  NEURO: Cranial nerves grossly intact.  Mentation and speech appropriate for age.  PSYCH: Mentation appears normal, affect normal/bright, judgement and insight intact, normal speech and appearance well-groomed.    No results found for this or any previous visit (from the past 24 hour(s)).            Video-Visit Details    Type of service:  Video Visit     Originating Location (pt. Location): Home    Distant Location (provider location):  On-site  Platform used for Video Visit: Astaro

## 2024-03-26 DIAGNOSIS — J45.40 MODERATE PERSISTENT ASTHMA WITHOUT COMPLICATION: ICD-10-CM

## 2024-03-26 RX ORDER — ALBUTEROL SULFATE 90 UG/1
2 AEROSOL, METERED RESPIRATORY (INHALATION) EVERY 4 HOURS PRN
Qty: 8.5 G | Refills: 4 | Status: SHIPPED | OUTPATIENT
Start: 2024-03-26

## 2024-04-22 ENCOUNTER — OFFICE VISIT (OUTPATIENT)
Dept: FAMILY MEDICINE | Facility: CLINIC | Age: 44
End: 2024-04-22
Payer: COMMERCIAL

## 2024-04-22 VITALS
WEIGHT: 235.6 LBS | SYSTOLIC BLOOD PRESSURE: 119 MMHG | HEIGHT: 68 IN | HEART RATE: 74 BPM | DIASTOLIC BLOOD PRESSURE: 80 MMHG | BODY MASS INDEX: 35.71 KG/M2 | OXYGEN SATURATION: 97 %

## 2024-04-22 DIAGNOSIS — M79.672 LEFT FOOT PAIN: ICD-10-CM

## 2024-04-22 DIAGNOSIS — F19.11 SUBSTANCE ABUSE IN REMISSION (H): ICD-10-CM

## 2024-04-22 DIAGNOSIS — T78.01XS ALLERGY WITH ANAPHYLAXIS DUE TO PEANUTS, SEQUELA: ICD-10-CM

## 2024-04-22 DIAGNOSIS — Z00.00 ROUTINE GENERAL MEDICAL EXAMINATION AT A HEALTH CARE FACILITY: Primary | ICD-10-CM

## 2024-04-22 LAB — HBA1C MFR BLD: 5.1 % (ref 0–5.6)

## 2024-04-22 PROCEDURE — 80048 BASIC METABOLIC PNL TOTAL CA: CPT

## 2024-04-22 PROCEDURE — 83036 HEMOGLOBIN GLYCOSYLATED A1C: CPT

## 2024-04-22 PROCEDURE — 99396 PREV VISIT EST AGE 40-64: CPT

## 2024-04-22 PROCEDURE — 99213 OFFICE O/P EST LOW 20 MIN: CPT | Mod: 25

## 2024-04-22 PROCEDURE — 80061 LIPID PANEL: CPT

## 2024-04-22 PROCEDURE — 36415 COLL VENOUS BLD VENIPUNCTURE: CPT

## 2024-04-22 RX ORDER — EPINEPHRINE 0.3 MG/.3ML
0.3 INJECTION SUBCUTANEOUS PRN
Qty: 2 EACH | Refills: 0 | Status: SHIPPED | OUTPATIENT
Start: 2024-04-22

## 2024-04-22 SDOH — HEALTH STABILITY: PHYSICAL HEALTH: ON AVERAGE, HOW MANY DAYS PER WEEK DO YOU ENGAGE IN MODERATE TO STRENUOUS EXERCISE (LIKE A BRISK WALK)?: 5 DAYS

## 2024-04-22 ASSESSMENT — SOCIAL DETERMINANTS OF HEALTH (SDOH): HOW OFTEN DO YOU GET TOGETHER WITH FRIENDS OR RELATIVES?: ONCE A WEEK

## 2024-04-22 ASSESSMENT — ASTHMA QUESTIONNAIRES
QUESTION_3 LAST FOUR WEEKS HOW OFTEN DID YOUR ASTHMA SYMPTOMS (WHEEZING, COUGHING, SHORTNESS OF BREATH, CHEST TIGHTNESS OR PAIN) WAKE YOU UP AT NIGHT OR EARLIER THAN USUAL IN THE MORNING: NOT AT ALL
ACT_TOTALSCORE: 20
QUESTION_1 LAST FOUR WEEKS HOW MUCH OF THE TIME DID YOUR ASTHMA KEEP YOU FROM GETTING AS MUCH DONE AT WORK, SCHOOL OR AT HOME: A LITTLE OF THE TIME
QUESTION_5 LAST FOUR WEEKS HOW WOULD YOU RATE YOUR ASTHMA CONTROL: WELL CONTROLLED
QUESTION_4 LAST FOUR WEEKS HOW OFTEN HAVE YOU USED YOUR RESCUE INHALER OR NEBULIZER MEDICATION (SUCH AS ALBUTEROL): TWO OR THREE TIMES PER WEEK
QUESTION_2 LAST FOUR WEEKS HOW OFTEN HAVE YOU HAD SHORTNESS OF BREATH: ONCE OR TWICE A WEEK
ACT_TOTALSCORE: 20

## 2024-04-22 NOTE — PATIENT INSTRUCTIONS
Preventive Care Advice   This is general advice given by our system to help you stay healthy. However, your care team may have specific advice just for you. Please talk to your care team about your preventive care needs.  Nutrition  Eat 5 or more servings of fruits and vegetables each day.  Try wheat bread, brown rice and whole grain pasta (instead of white bread, rice, and pasta).  Get enough calcium and vitamin D. Check the label on foods and aim for 100% of the RDA (recommended daily allowance).  Lifestyle  Exercise at least 150 minutes each week   (30 minutes a day, 5 days a week).  Do muscle strengthening activities 2 days a week. These help control your weight and prevent disease.  No smoking.  Wear sunscreen to prevent skin cancer.  Have a dental exam and cleaning every 6 months.  Yearly exams  See your health care team every year to talk about:  Any changes in your health.  Any medicines your care team has prescribed.  Preventive care, family planning, and ways to prevent chronic diseases.  Shots (vaccines)   HPV shots (up to age 26), if you've never had them before.  Hepatitis B shots (up to age 59), if you've never had them before.  COVID-19 shot: Get this shot when it's due.  Flu shot: Get a flu shot every year.  Tetanus shot: Get a tetanus shot every 10 years.  Pneumococcal, hepatitis A, and RSV shots: Ask your care team if you need these based on your risk.  Shingles shot (for age 50 and up).  General health tests  Diabetes screening:  Starting at age 35, Get screened for diabetes at least every 3 years.  If you are younger than age 35, ask your care team if you should be screened for diabetes.  Cholesterol test: At age 39, start having a cholesterol test every 5 years, or more often if advised.  Bone density scan (DEXA): At age 50, ask your care team if you should have this scan for osteoporosis (brittle bones).  Hepatitis C: Get tested at least once in your life.  STIs (sexually transmitted  infections)  Before age 24: Ask your care team if you should be screened for STIs.  After age 24: Get screened for STIs if you're at risk. You are at risk for STIs (including HIV) if:  You are sexually active with more than one person.  You don't use condoms every time.  You or a partner was diagnosed with a sexually transmitted infection.  If you are at risk for HIV, ask about PrEP medicine to prevent HIV.  Get tested for HIV at least once in your life, whether you are at risk for HIV or not.  Cancer screening tests  Cervical cancer screening: If you have a cervix, begin getting regular cervical cancer screening tests at age 21. Most people who have regular screenings with normal results can stop after age 65. Talk about this with your provider.  Breast cancer scan (mammogram): If you've ever had breasts, begin having regular mammograms starting at age 40. This is a scan to check for breast cancer.  Colon cancer screening: It is important to start screening for colon cancer at age 45.  Have a colonoscopy test every 10 years (or more often if you're at risk) Or, ask your provider about stool tests like a FIT test every year or Cologuard test every 3 years.  To learn more about your testing options, visit: https://[email or web address]/191689.pdf.  For help making a decision, visit: https://bit.ly/zu85354.  Prostate cancer screening test: If you have a prostate and are age 55 to 69, ask your provider if you would benefit from a yearly prostate cancer screening test.  Lung cancer screening: If you are a current or former smoker age 50 to 80, ask your care team if ongoing lung cancer screenings are right for you.  For informational purposes only. Not to replace the advice of your health care provider. Copyright   2023 [employer]. All rights reserved. Clinically reviewed by the [facility] Transitions Program. Ruck.us 138866 - REV 01/24.

## 2024-04-22 NOTE — PROGRESS NOTES
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 New prescription sent to pharmacy.   - EPINEPHrine (ANY BX GENERIC EQUIV) 0.3 MG/0.3ML injection 2-pack; Inject 0.3 mLs (0.3 mg) into the muscle as needed for anaphylaxis May repeat one time in 5-15 minutes if response to initial dose is inadequate.    Left foot pain  Pain to left foot appearing to be due to bone deformity of the left lateral aspect of the fifth metatarsal. Ortho referral for further evaluation and treatment.   - Orthopedic  Referral; Future    BMI  Estimated body mass index is 35.56 kg/m  as calculated from the following:    Height as of this encounter: 1.734 m (5' 8.25\").    Weight as of this encounter: 106.9 kg (235 lb 9.6 oz).       Counseling  Appropriate preventive services were discussed with this patient, including applicable screening as appropriate for fall prevention, nutrition, physical activity, Tobacco-use cessation, weight loss and cognition.  Checklist reviewing preventive services available has been given to the patient.  Reviewed patient's diet, addressing concerns and/or questions.   The patient was instructed to see the dentist every 6 months.   He is at risk for psychosocial distress and has been provided with information to reduce risk.       Subjective   Mason is a " 43 year old, presenting for the following:  Physical and Foot Pain (Outer lt foot pain)        4/22/2024     5:03 PM   Additional Questions   Roomed by tammy heath        Health Care Directive  Patient does not have a Health Care Directive or Living Will: Discussed advance care planning with patient; however, patient declined at this time.    HPI  Works for [employer]    Concern - foot pain lt foot outer side  Onset: 2 months ago  Description: soreness on the side  Intensity: mild  Progression of Symptoms:  depends on what's going on-standing, running too long hurts  Accompanying Signs & Symptoms:   Previous history of similar problem: no  Precipitating factors:        Worsened by: running, standing, playing pickle ball for too long  Alleviating factors:        Improved by:   Therapies tried and outcome: None        4/22/2024   General Health   How would you rate your overall physical health? Good   Feel stress (tense, anxious, or unable to sleep) Only a little   (!) STRESS CONCERN      4/22/2024   Nutrition   Three or more servings of calcium each day? Yes   Diet: Regular (no restrictions)   How many servings of fruit and vegetables per day? (!) 0-1   How many sweetened beverages each day? 0-1         4/22/2024   Exercise   Days per week of moderate/strenous exercise 5 days         4/22/2024   Social Factors   Frequency of gathering with friends or relatives Once a week   Worry food won't last until get money to buy more No   Food not last or not have enough money for food? No   Do you have housing?  Yes   Are you worried about losing your housing? No   Lack of transportation? No   Unable to get utilities (heat,electricity)? No         4/22/2024   Dental   Dentist two times every year? (!) NO   Discussed       4/22/2024   TB Screening   Were you born outside of the [place]? No     Today's PHQ-2 Score:       4/22/2024     5:09 PM   PHQ-2 ( 1999 [employer])   Q1: Little interest or pleasure in doing things 0   Q2: Feeling  "down, depressed or hopeless 0   PHQ-2 Score 0         4/22/2024   Substance Use   Alcohol more than 3/day or more than 7/wk No   Do you use any other substances recreationally? No     Social History     Tobacco Use    Smoking status: Former     Current packs/day: 0.50     Average packs/day: 0.5 packs/day for 25.0 years (12.5 ttl pk-yrs)     Types: Cigarettes    Smokeless tobacco: Never   Vaping Use    Vaping status: Never Used   Substance Use Topics    Alcohol use: Not Currently    Drug use: Not Currently     Types: Methamphetamines     Comment: in remission          4/22/2024   STI Screening   New sexual partner(s) since last STI/HIV test? No   ASCVD Risk   The 10-year ASCVD risk score (Nani PHILLIPS, et al., 2019) is: 1.3%    Values used to calculate the score:      Age: 43 years      Sex: Male      Is Non- : No      Diabetic: No      Tobacco smoker: No      Systolic Blood Pressure: 119 mmHg      Is BP treated: No      HDL Cholesterol: 64 mg/dL      Total Cholesterol: 232 mg/dL        4/22/2024   Contraception/Family Planning   Questions about contraception or family planning No        Reviewed and updated as needed this visit by Provider   Tobacco  Allergies  Meds  Problems  Med Hx  Surg Hx  Fam Hx                     Objective    Exam  /80 (BP Location: Right arm, Patient Position: Sitting, Cuff Size: Adult Large)   Pulse 74   Ht 1.734 m (5' 8.25\")   Wt 106.9 kg (235 lb 9.6 oz)   SpO2 97%   BMI 35.56 kg/m     Estimated body mass index is 35.56 kg/m  as calculated from the following:    Height as of this encounter: 1.734 m (5' 8.25\").    Weight as of this encounter: 106.9 kg (235 lb 9.6 oz).    Physical Exam  GENERAL: alert and no distress  EYES: Eyes grossly normal to inspection, PERRL and conjunctivae and sclerae normal  HENT: ear canals and TM's normal, nose and mouth without ulcers or lesions  NECK: no adenopathy, no asymmetry, masses, or scars  RESP: lungs clear " to auscultation - no rales, rhonchi or wheezes  CV: regular rate and rhythm, normal S1 S2, no S3 or S4, no murmur, click or rub, no peripheral edema  ABDOMEN: soft, nontender, no hepatosplenomegaly, no masses and bowel sounds normal  MS: LLE exam shows bone deformity of the left lateral aspect of the fifth metatarsal. Tenderness with palpation   SKIN: no suspicious lesions or rashes  NEURO: Normal strength and tone, mentation intact and speech normal  PSYCH: mentation appears normal, affect normal/bright    Signed Electronically by: MAKAYLA Lockwood CNP

## 2024-04-23 LAB
ANION GAP SERPL CALCULATED.3IONS-SCNC: 11 MMOL/L (ref 7–15)
BUN SERPL-MCNC: 27.2 MG/DL (ref 6–20)
CALCIUM SERPL-MCNC: 9.5 MG/DL (ref 8.6–10)
CHLORIDE SERPL-SCNC: 102 MMOL/L (ref 98–107)
CHOLEST SERPL-MCNC: 233 MG/DL
CREAT SERPL-MCNC: 0.95 MG/DL (ref 0.67–1.17)
DEPRECATED HCO3 PLAS-SCNC: 26 MMOL/L (ref 22–29)
EGFRCR SERPLBLD CKD-EPI 2021: >90 ML/MIN/1.73M2
FASTING STATUS PATIENT QL REPORTED: ABNORMAL
GLUCOSE SERPL-MCNC: 97 MG/DL (ref 70–99)
HDLC SERPL-MCNC: 50 MG/DL
LDLC SERPL CALC-MCNC: 131 MG/DL
NONHDLC SERPL-MCNC: 183 MG/DL
POTASSIUM SERPL-SCNC: 4.3 MMOL/L (ref 3.4–5.3)
SODIUM SERPL-SCNC: 139 MMOL/L (ref 135–145)
TRIGL SERPL-MCNC: 260 MG/DL

## 2025-03-26 ENCOUNTER — OFFICE VISIT (OUTPATIENT)
Dept: FAMILY MEDICINE | Facility: CLINIC | Age: 45
End: 2025-03-26
Payer: COMMERCIAL

## 2025-03-26 VITALS
TEMPERATURE: 97.7 F | HEIGHT: 68 IN | BODY MASS INDEX: 36.53 KG/M2 | HEART RATE: 72 BPM | DIASTOLIC BLOOD PRESSURE: 83 MMHG | OXYGEN SATURATION: 98 % | RESPIRATION RATE: 20 BRPM | WEIGHT: 241 LBS | SYSTOLIC BLOOD PRESSURE: 122 MMHG

## 2025-03-26 DIAGNOSIS — T78.01XS ALLERGY WITH ANAPHYLAXIS DUE TO PEANUTS, SEQUELA: ICD-10-CM

## 2025-03-26 DIAGNOSIS — Z00.00 ROUTINE GENERAL MEDICAL EXAMINATION AT A HEALTH CARE FACILITY: Primary | ICD-10-CM

## 2025-03-26 DIAGNOSIS — J32.9 SINUSITIS, UNSPECIFIED CHRONICITY, UNSPECIFIED LOCATION: ICD-10-CM

## 2025-03-26 DIAGNOSIS — Z13.1 SCREENING FOR DIABETES MELLITUS: ICD-10-CM

## 2025-03-26 DIAGNOSIS — F19.11 SUBSTANCE ABUSE IN REMISSION (H): ICD-10-CM

## 2025-03-26 DIAGNOSIS — Z13.220 SCREENING FOR CHOLESTEROL LEVEL: ICD-10-CM

## 2025-03-26 DIAGNOSIS — J45.40 MODERATE PERSISTENT ASTHMA WITHOUT COMPLICATION: ICD-10-CM

## 2025-03-26 DIAGNOSIS — Z13.29 SCREENING FOR THYROID DISORDER: ICD-10-CM

## 2025-03-26 PROBLEM — J45.909 ASTHMA: Chronic | Status: ACTIVE | Noted: 2025-03-26

## 2025-03-26 PROBLEM — F90.9 ATTENTION DEFICIT HYPERACTIVITY DISORDER (ADHD): Chronic | Status: ACTIVE | Noted: 2025-03-26

## 2025-03-26 PROBLEM — F17.210 CIGARETTE SMOKER MOTIVATED TO QUIT: Status: RESOLVED | Noted: 2019-11-14 | Resolved: 2025-03-26

## 2025-03-26 LAB
ANION GAP SERPL CALCULATED.3IONS-SCNC: 10 MMOL/L (ref 7–15)
BUN SERPL-MCNC: 23.2 MG/DL (ref 6–20)
CALCIUM SERPL-MCNC: 9.7 MG/DL (ref 8.8–10.4)
CHLORIDE SERPL-SCNC: 104 MMOL/L (ref 98–107)
CHOLEST SERPL-MCNC: 188 MG/DL
CREAT SERPL-MCNC: 0.88 MG/DL (ref 0.67–1.17)
EGFRCR SERPLBLD CKD-EPI 2021: >90 ML/MIN/1.73M2
EST. AVERAGE GLUCOSE BLD GHB EST-MCNC: 103 MG/DL
FASTING STATUS PATIENT QL REPORTED: YES
FASTING STATUS PATIENT QL REPORTED: YES
GLUCOSE SERPL-MCNC: 100 MG/DL (ref 70–99)
HBA1C MFR BLD: 5.2 % (ref 0–5.6)
HCO3 SERPL-SCNC: 23 MMOL/L (ref 22–29)
HDLC SERPL-MCNC: 48 MG/DL
LDLC SERPL CALC-MCNC: 92 MG/DL
NONHDLC SERPL-MCNC: 140 MG/DL
POTASSIUM SERPL-SCNC: 4.2 MMOL/L (ref 3.4–5.3)
SODIUM SERPL-SCNC: 137 MMOL/L (ref 135–145)
TRIGL SERPL-MCNC: 242 MG/DL
TSH SERPL DL<=0.005 MIU/L-ACNC: 1.41 UIU/ML (ref 0.3–4.2)

## 2025-03-26 PROCEDURE — 99213 OFFICE O/P EST LOW 20 MIN: CPT | Mod: 25

## 2025-03-26 PROCEDURE — 84443 ASSAY THYROID STIM HORMONE: CPT

## 2025-03-26 PROCEDURE — 36415 COLL VENOUS BLD VENIPUNCTURE: CPT

## 2025-03-26 PROCEDURE — 3074F SYST BP LT 130 MM HG: CPT

## 2025-03-26 PROCEDURE — G2211 COMPLEX E/M VISIT ADD ON: HCPCS

## 2025-03-26 PROCEDURE — 99396 PREV VISIT EST AGE 40-64: CPT

## 2025-03-26 PROCEDURE — 80048 BASIC METABOLIC PNL TOTAL CA: CPT

## 2025-03-26 PROCEDURE — 3079F DIAST BP 80-89 MM HG: CPT

## 2025-03-26 PROCEDURE — 83036 HEMOGLOBIN GLYCOSYLATED A1C: CPT

## 2025-03-26 PROCEDURE — 80061 LIPID PANEL: CPT

## 2025-03-26 RX ORDER — ALBUTEROL SULFATE 90 UG/1
2 INHALANT RESPIRATORY (INHALATION) EVERY 4 HOURS PRN
Qty: 8.5 G | Refills: 4 | Status: SHIPPED | OUTPATIENT
Start: 2025-03-26

## 2025-03-26 RX ORDER — EPINEPHRINE 0.3 MG/.3ML
0.3 INJECTION SUBCUTANEOUS PRN
Qty: 2 EACH | Refills: 0 | Status: SHIPPED | OUTPATIENT
Start: 2025-03-26

## 2025-03-26 SDOH — HEALTH STABILITY: PHYSICAL HEALTH: ON AVERAGE, HOW MANY MINUTES DO YOU ENGAGE IN EXERCISE AT THIS LEVEL?: 60 MIN

## 2025-03-26 SDOH — HEALTH STABILITY: PHYSICAL HEALTH: ON AVERAGE, HOW MANY DAYS PER WEEK DO YOU ENGAGE IN MODERATE TO STRENUOUS EXERCISE (LIKE A BRISK WALK)?: 5 DAYS

## 2025-03-26 ASSESSMENT — ASTHMA QUESTIONNAIRES
QUESTION_1 LAST FOUR WEEKS HOW MUCH OF THE TIME DID YOUR ASTHMA KEEP YOU FROM GETTING AS MUCH DONE AT WORK, SCHOOL OR AT HOME: NONE OF THE TIME
QUESTION_3 LAST FOUR WEEKS HOW OFTEN DID YOUR ASTHMA SYMPTOMS (WHEEZING, COUGHING, SHORTNESS OF BREATH, CHEST TIGHTNESS OR PAIN) WAKE YOU UP AT NIGHT OR EARLIER THAN USUAL IN THE MORNING: ONCE OR TWICE
QUESTION_4 LAST FOUR WEEKS HOW OFTEN HAVE YOU USED YOUR RESCUE INHALER OR NEBULIZER MEDICATION (SUCH AS ALBUTEROL): TWO OR THREE TIMES PER WEEK
QUESTION_2 LAST FOUR WEEKS HOW OFTEN HAVE YOU HAD SHORTNESS OF BREATH: NOT AT ALL
ACT_TOTALSCORE: 22
QUESTION_5 LAST FOUR WEEKS HOW WOULD YOU RATE YOUR ASTHMA CONTROL: COMPLETELY CONTROLLED

## 2025-03-26 ASSESSMENT — SOCIAL DETERMINANTS OF HEALTH (SDOH): HOW OFTEN DO YOU GET TOGETHER WITH FRIENDS OR RELATIVES?: ONCE A WEEK

## 2025-03-26 NOTE — PROGRESS NOTES
"Preventive Care Visit  [facility] Practitioner Primary Care  Mar 26, 2025      Assessment & Plan     Routine general medical examination at a health care facility  Follow up in one year    Screening for cholesterol level  Not fasting   - Lipid panel reflex to direct LDL Fasting; Future  - Lipid panel reflex to direct LDL Fasting    Screening for diabetes mellitus  - Basic metabolic panel  (Ca, Cl, CO2, Creat, Gluc, K, Na, BUN); Future  - Hemoglobin A1c; Future  - Basic metabolic panel  (Ca, Cl, CO2, Creat, Gluc, K, Na, BUN)  - Hemoglobin A1c    Screening for thyroid disorder  - TSH with free T4 reflex; Future  - TSH with free T4 reflex    Moderate persistent asthma without complication  Stable, albuterol refilled.   - albuterol (PROAIR HFA/PROVENTIL HFA/VENTOLIN HFA) 108 (90 Base) MCG/ACT inhaler; Inhale 2 puffs into the lungs every 4 hours as needed for shortness of breath or wheezing.    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   - EPINEPHrine (ANY BX GENERIC EQUIV) 0.3 MG/0.3ML injection 2-pack; Inject 0.3 mLs (0.3 mg) into the muscle as needed for anaphylaxis. May repeat one time in 5-15 minutes if response to initial dose is inadequate.    Sinusitis, unspecified chronicity, unspecified location  Symptoms sound more sinus related. Augmentin prescribed   - amoxicillin-clavulanate (AUGMENTIN) 875-125 MG tablet; Take 1 tablet by mouth 2 times daily for 10 days.    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 Due to the added complexity in care, I will continue to support Mason in the subsequent management and with ongoing continuity of care.    BMI  Estimated body mass index is 36.38 kg/m  as calculated from the following:    Height as of this encounter: 1.734 m (5' 8.25\").    Weight as " of this encounter: 109.3 kg (241 lb).       Counseling  Appropriate preventive services were addressed with this patient via screening, questionnaire, or discussion as appropriate for fall prevention, nutrition, physical activity, Tobacco-use cessation, social engagement, weight loss and cognition.  Checklist reviewing preventive services available has been given to the patient.  Reviewed patient's diet, addressing concerns and/or questions.     Isabella Cuevas is a 44 year old, presenting for the following:  Physical and URI (For 1 month, cough)        3/26/2025    10:51 AM   Additional Questions   Roomed by tammy KILGORE    Acute Illness  Acute illness concerns: cold, cough  Onset/Duration: 1+month  Symptoms:  Fever: No  Chills/Sweats: No  Headache (location?): No  Sinus Pressure: No  Conjunctivitis:  No  Ear Pain: no  Rhinorrhea: No  Congestion: YES- a little bit  Sore Throat: No  Cough: YES-non-productive  2 weeks ago, mucousy  Wheeze: a few weeks ago, currently no  Decreased Appetite: No  Nausea: No  Vomiting: No  Diarrhea: No  Dysuria/Freq.: No  Dysuria or Hematuria: No  Fatigue/Achiness: YES  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      3/26/2025   General Health   How would you rate your overall physical health? Good   Feel stress (tense, anxious, or unable to sleep) Not at all         3/26/2025   Nutrition   Three or more servings of calcium each day? Yes   Diet: Regular (no restrictions)   How many servings of fruit and vegetables per day? (!) 0-1   How many sweetened beverages each day? 0-1         3/26/2025   Exercise   Days per week of moderate/strenous  exercise 5 days   Average minutes spent exercising at this level 60 min         3/26/2025   Social Factors   Frequency of gathering with friends or relatives Once a week   Worry food won't last until get money to buy more No   Food not last or not have enough money for food? No   Do you have housing? (Housing is defined as stable permanent housing and does not include staying ouside in a car, in a tent, in an abandoned building, in an overnight shelter, or couch-surfing.) Yes   Are you worried about losing your housing? No   Lack of transportation? No   Unable to get utilities (heat,electricity)? No         3/26/2025   Dental   Dentist two times every year? Yes           4/22/2024   TB Screening   Were you born outside of the [place]? No       Today's PHQ-2 Score:       3/26/2025    10:40 AM   PHQ-2 ( 1999 Pf[employer])   Q1: Little interest or pleasure in doing things 0   Q2: Feeling down, depressed or hopeless 0   PHQ-2 Score 0    Q1: Little interest or pleasure in doing things Not at all   Q2: Feeling down, depressed or hopeless Not at all   PHQ-2 Score 0       Patient-reported           3/26/2025   Substance Use   Alcohol more than 3/day or more than 7/wk No   Do you use any other substances recreationally? No     Social History     Tobacco Use    Smoking status: Former     Current packs/day: 0.50     Average packs/day: 0.5 packs/day for 25.0 years (12.5 ttl pk-yrs)     Types: Cigarettes    Smokeless tobacco: Never   Vaping Use    Vaping status: Never Used   Substance Use Topics    Alcohol use: Not Currently    Drug use: Not Currently     Types: Methamphetamines     Comment: in remission            3/26/2025   STI Screening   New sexual partner(s) since last STI/HIV test? No   ASCVD Risk   The 10-year ASCVD risk score (Nani PHILLIPS, et al., 2019) is: 2.2%    Values used to calculate the score:      Age: 44 years      Sex: Male      Is Non- : No      Diabetic: No      Tobacco smoker: No       "Systolic Blood Pressure: 122 mmHg      Is BP treated: No      HDL Cholesterol: 50 mg/dL      Total Cholesterol: 233 mg/dL        3/26/2025   Contraception/Family Planning   Questions about contraception or family planning No        Reviewed and updated as needed this visit by Provider   Tobacco  Allergies  Meds  Problems  Med Hx  Surg Hx  Fam Hx                     Objective    Exam  /83 (BP Location: Right arm, Patient Position: Sitting, Cuff Size: Adult Large)   Pulse 72   Temp 97.7  F (36.5  C) (Temporal)   Resp 20   Ht 1.734 m (5' 8.25\")   Wt 109.3 kg (241 lb)   SpO2 98%   BMI 36.38 kg/m     Estimated body mass index is 36.38 kg/m  as calculated from the following:    Height as of this encounter: 1.734 m (5' 8.25\").    Weight as of this encounter: 109.3 kg (241 lb).    Physical Exam  GENERAL: alert and no distress  EYES: Eyes grossly normal to inspection, PERRL and conjunctivae and sclerae normal  HENT: ear canals and TM's normal, nose and mouth without ulcers or lesions  NECK: no adenopathy, no asymmetry, masses, or scars  RESP: lungs clear to auscultation - no rales, rhonchi or wheezes  CV: regular rate and rhythm, normal S1 S2, no S3 or S4, no murmur, click or rub, no peripheral edema  ABDOMEN: soft, nontender, no hepatosplenomegaly, no masses and bowel sounds normal  MS: no gross musculoskeletal defects noted, no edema  SKIN: no suspicious lesions or rashes  NEURO: Normal strength and tone, mentation intact and speech normal  PSYCH: mentation appears normal, affect normal/bright        Signed Electronically by: MAKAYLA Lockwood CNP    "

## 2025-03-26 NOTE — RESULT ENCOUNTER NOTE
Jarocho Cuevas,     Your cholesterol levels have improved from one year ago which is great. Your triglyceride levels are slightly elevated, this is most likely due to you eating before the blood work so nothing further needs to be done at this time.     Your hgba1c (HgbA1C is a blood test that looks at your average blood sugar over a three month time span) is normal and all of your other labs are normal     Feel free to contact me via [employer] or call the clinic at [phone].     Sincerely,  Felicia Jiménez, LORIN, FNP-C

## 2025-03-26 NOTE — PATIENT INSTRUCTIONS
Patient Education   Preventive Care Advice   This is general advice given by our system to help you stay healthy. However, your care team may have specific advice just for you. Please talk to your care team about your preventive care needs.  Nutrition  Eat 5 or more servings of fruits and vegetables each day.  Try wheat bread, brown rice and whole grain pasta (instead of white bread, rice, and pasta).  Get enough calcium and vitamin D. Check the label on foods and aim for 100% of the RDA (recommended daily allowance).  Lifestyle  Exercise at least 150 minutes each week  (30 minutes a day, 5 days a week).  Do muscle strengthening activities 2 days a week. These help control your weight and prevent disease.  No smoking.  Wear sunscreen to prevent skin cancer.  Have a dental exam and cleaning every 6 months.  Yearly exams  See your health care team every year to talk about:  Any changes in your health.  Any medicines your care team has prescribed.  Preventive care, family planning, and ways to prevent chronic diseases.  Shots (vaccines)   HPV shots (up to age 26), if you've never had them before.  Hepatitis B shots (up to age 59), if you've never had them before.  COVID-19 shot: Get this shot when it's due.  Flu shot: Get a flu shot every year.  Tetanus shot: Get a tetanus shot every 10 years.  Pneumococcal, hepatitis A, and RSV shots: Ask your care team if you need these based on your risk.  Shingles shot (for age 50 and up)  General health tests  Diabetes screening:  Starting at age 35, Get screened for diabetes at least every 3 years.  If you are younger than age 35, ask your care team if you should be screened for diabetes.  Cholesterol test: At age 39, start having a cholesterol test every 5 years, or more often if advised.  Bone density scan (DEXA): At age 50, ask your care team if you should have this scan for osteoporosis (brittle bones).  Hepatitis C: Get tested at least once in your life.  STIs (sexually  transmitted infections)  Before age 24: Ask your care team if you should be screened for STIs.  After age 24: Get screened for STIs if you're at risk. You are at risk for STIs (including HIV) if:  You are sexually active with more than one person.  You don't use condoms every time.  You or a partner was diagnosed with a sexually transmitted infection.  If you are at risk for HIV, ask about PrEP medicine to prevent HIV.  Get tested for HIV at least once in your life, whether you are at risk for HIV or not.  Cancer screening tests  Cervical cancer screening: If you have a cervix, begin getting regular cervical cancer screening tests starting at age 21.  Breast cancer scan (mammogram): If you've ever had breasts, begin having regular mammograms starting at age 40. This is a scan to check for breast cancer.  Colon cancer screening: It is important to start screening for colon cancer at age 45.  Have a colonoscopy test every 10 years (or more often if you're at risk) Or, ask your provider about stool tests like a FIT test every year or Cologuard test every 3 years.  To learn more about your testing options, visit:   .  For help making a decision, visit:   https://bit.ly/jr27896.  Prostate cancer screening test: If you have a prostate, ask your care team if a prostate cancer screening test (PSA) at age 55 is right for you.  Lung cancer screening: If you are a current or former smoker ages 50 to 80, ask your care team if ongoing lung cancer screenings are right for you.  For informational purposes only. Not to replace the advice of your health care provider. Copyright   2023 [employer]. All rights reserved. Clinically reviewed by the [facility] Transitions Program. [employer] 884702 - REV 01/24.

## 2025-03-27 ENCOUNTER — MYC REFILL (OUTPATIENT)
Dept: FAMILY MEDICINE | Facility: CLINIC | Age: 45
End: 2025-03-27
Payer: COMMERCIAL

## 2025-03-27 DIAGNOSIS — J32.9 SINUSITIS, UNSPECIFIED CHRONICITY, UNSPECIFIED LOCATION: ICD-10-CM

## 2025-04-28 ENCOUNTER — PATIENT OUTREACH (OUTPATIENT)
Dept: CARE COORDINATION | Facility: CLINIC | Age: 45
End: 2025-04-28
Payer: COMMERCIAL

## 2025-08-24 ENCOUNTER — MYC REFILL (OUTPATIENT)
Dept: FAMILY MEDICINE | Facility: CLINIC | Age: 45
End: 2025-08-24
Payer: COMMERCIAL

## 2025-08-24 DIAGNOSIS — J45.40 MODERATE PERSISTENT ASTHMA WITHOUT COMPLICATION: ICD-10-CM

## 2025-08-24 RX ORDER — ALBUTEROL SULFATE 90 UG/1
2 INHALANT RESPIRATORY (INHALATION) EVERY 4 HOURS PRN
Qty: 8.5 G | Refills: 0 | Status: SHIPPED | OUTPATIENT
Start: 2025-08-24